# Patient Record
Sex: FEMALE | Race: WHITE | NOT HISPANIC OR LATINO | Employment: UNEMPLOYED | URBAN - METROPOLITAN AREA
[De-identification: names, ages, dates, MRNs, and addresses within clinical notes are randomized per-mention and may not be internally consistent; named-entity substitution may affect disease eponyms.]

---

## 2017-09-13 ENCOUNTER — CONSULT (OUTPATIENT)
Dept: CARDIOLOGY | Facility: CLINIC | Age: 57
End: 2017-09-13

## 2017-09-13 VITALS
DIASTOLIC BLOOD PRESSURE: 108 MMHG | HEART RATE: 65 BPM | WEIGHT: 222.8 LBS | BODY MASS INDEX: 39.48 KG/M2 | SYSTOLIC BLOOD PRESSURE: 162 MMHG | HEIGHT: 63 IN

## 2017-09-13 DIAGNOSIS — R00.2 PALPITATIONS: Primary | ICD-10-CM

## 2017-09-13 DIAGNOSIS — I10 ESSENTIAL HYPERTENSION: ICD-10-CM

## 2017-09-13 DIAGNOSIS — I48.0 PAF (PAROXYSMAL ATRIAL FIBRILLATION) (HCC): ICD-10-CM

## 2017-09-13 PROCEDURE — 99204 OFFICE O/P NEW MOD 45 MIN: CPT | Performed by: INTERNAL MEDICINE

## 2017-09-13 PROCEDURE — 93000 ELECTROCARDIOGRAM COMPLETE: CPT | Performed by: INTERNAL MEDICINE

## 2017-09-13 RX ORDER — CLONAZEPAM 1 MG/1
1 TABLET ORAL 2 TIMES DAILY
COMMUNITY

## 2017-09-13 RX ORDER — NEBIVOLOL 10 MG/1
10 TABLET ORAL DAILY
COMMUNITY
End: 2019-10-07

## 2017-09-13 RX ORDER — MELATONIN
2000 DAILY
COMMUNITY

## 2017-09-13 RX ORDER — FLECAINIDE ACETATE 100 MG/1
100 TABLET ORAL 2 TIMES DAILY
COMMUNITY

## 2017-09-13 RX ORDER — FLUOXETINE HYDROCHLORIDE 20 MG/1
20 CAPSULE ORAL 2 TIMES DAILY
COMMUNITY
End: 2020-07-24

## 2017-09-13 RX ORDER — FLUTICASONE PROPIONATE 50 MCG
2 SPRAY, SUSPENSION (ML) NASAL DAILY
COMMUNITY

## 2017-09-13 RX ORDER — LOSARTAN POTASSIUM 50 MG/1
50 TABLET ORAL DAILY
COMMUNITY
End: 2019-09-26

## 2017-09-13 NOTE — PROGRESS NOTES
Electrophysiology Consult     Annabel Sorensen  1960  [phone]      09/13/17    DATE OF ADMISSION: (Not on file)  [facility] CARDIOLOGY    Esteban Garcia MD  [address] [place] [address] / [facility] [id number]    Chief Complaint   Patient presents with   • Atrial Fibrillation     Problem List:  1. Paroxysmal Atrial Fibrillation/PAT    A. CHADSVasc = 2   B. History of Flecainide Rx   C. PVA 1/16/07   D. Remote normal stress test - data deficit  2. HTN  3. Obesity  4. Anxiety  5. Optic neuritis  6. Chronic Headaches  7. Surgical History   A. Left nasal resection   B. Benign left cyst removal   C. Left brain meningioma with resection   D. BURT/BSO   E. axilllary cyst removal    F. cholecystectomy      History of Present Illness:   57 year old WF, previous patient of Dr. Washington's last seen in 2011, with history of PAF and PACs who presents today for re-evaluation. She had an ablation in 2007 and since then, has been on Flecainide since then to treat PACs. Over the last two years, she has had some increase in her palpitations. They occur about 3-4 times per month, described as a fluttering that does not last very long. She also states that her BPs have been up and down. Her BP gets as high as 190s. Dr. Garcia recently decreased her Bystolic due to low BP, and now she has occasional high readings associated with HA. She denies SOB, syncope, and CP. She states her thyroid levels have been good. She drinks 1 coffee per day. She denies tobacco. She drinks occasional ETOH. No stimulants.     Allergies   Allergen Reactions   • Ibuprofen    • Sulfa Antibiotics         Cannot display prior to admission medications because the patient has not been admitted in this contact.            Current Outpatient Prescriptions:   •  cholecalciferol (VITAMIN D3) 1000 units tablet, Take 1,000 Units by mouth Daily., Disp: , Rfl:   •  clonazePAM (KlonoPIN) 1 MG tablet, Take 1 mg by mouth 2 (Two) Times a Day., Disp: , Rfl:    •  flecainide (TAMBOCOR) 100 MG tablet, Take 100 mg by mouth 2 (Two) Times a Day., Disp: , Rfl:   •  FLUoxetine (PROzac) 20 MG capsule, Take 20 mg by mouth 2 (Two) Times a Day., Disp: , Rfl:   •  fluticasone (FLONASE) 50 MCG/ACT nasal spray, 2 sprays into each nostril Daily., Disp: , Rfl:   •  losartan (COZAAR) 50 MG tablet, Take 50 mg by mouth Daily., Disp: , Rfl:   •  nebivolol (BYSTOLIC) 10 MG tablet, Take 10 mg by mouth Daily., Disp: , Rfl:     Social History     Social History   • Marital status: Unknown     Spouse name: N/A   • Number of children: N/A   • Years of education: N/A     Social History Main Topics   • Smoking status: Never Smoker   • Smokeless tobacco: Never Used   • Alcohol use 0.6 - 1.2 oz/week     1 - 2 Standard drinks or equivalent per week      Comment: monthly   • Drug use: No   • Sexual activity: Defer     Other Topics Concern   • None     Social History Narrative       Family History   Problem Relation Age of Onset   • Stroke Mother      afib   • Cancer Father      lung       REVIEW OF SYSTEMS:   CONST:  No weight loss, fever, chills, weakness or fatigue.   HEENT:  No visual loss, blurred vision, double vision, yellow sclerae.                   No hearing loss, congestion, sore throat.   SKIN:      No rashes, urticaria, ulcers, sores.     RESP:     No shortness of breath, hemoptysis, cough, sputum.   GI:           No anorexia, nausea, vomiting, diarrhea. No abdominal pain, melena.   :         No burning on urination, hematuria or increased frequency.  ENDO:    No diaphoresis, cold or heat intolerance. No polyuria or polydipsia.   NEURO:  + headache, - dizziness, syncope, paralysis, ataxia, or parasthesias.                  No change in bowel or bladder control. No history of CVA/TIA  MUSC:    No muscle, back pain, joint pain or stiffness.   HEME:    No anemia, bleeding, bruising. No history of DVT/PE.  PSYCH:  No history of depression, anxiety    Vitals:    09/13/17 1257   BP: (!)  "162/108   BP Location: Right arm   Patient Position: Sitting   Pulse: 65   Weight: 222 lb 12.8 oz (101 kg)   Height: 63\" (160 cm)                 Physical Exam:  GEN: Well nourished, Well- developed  No acute distress  HEENT: Normocephalic, Atraumatic, PERRLA, moist mucous membranes  NECK: supple, NO JVD, no thyromegaly, no lymphadenopathy  CARD: S1S2  RRR no murmur, gallop, rub  LUNGS: Clear to auscultataion, normal respiratory effort  ABDOMEN: Soft, nontender, normal bowel sounds  EXTREMITIES:No gross deformities,  No clubbing, cyanosis, or edema  SKIN: Warm, dry  NEURO: No focal deficits  PSYCHIATRIC: Normal affect and mood        ECG 12 Lead  Date/Time: 9/13/2017 1:39 PM  Performed by: GERARD WASHINGTON  Authorized by: GERARD WASHINGTON   Rhythm: sinus rhythm  BPM: 65                ICD-10-CM ICD-9-CM   1. Palpitations R00.2 785.1   2. PAF (paroxysmal atrial fibrillation) I48.0 427.31   3. Essential hypertension I10 401.9       Assessment and Plan:   1. Palpitations - she has history of PAF s/p PVA in 2007. Her symptoms sound consistent with probable PACs. We will order an echocardiogram to assess LV function. She seems to be under a lot of stress and this could be triggering her events. If her symptoms get worse, will do event monitor.   2. HTN - with labile readings. She should take her Bystolic in the evening and Losartan at night. Continue to monitor at home.       Scribed for Gerard Washington MD by Liv Forte PA-C. 9/13/2017  1:40 PM      IGerard MD, personally performed the services described in this documentation as scribed by the above named individual in my presence, and it is both accurate and complete.  9/13/2017  1:43 PM  "

## 2017-09-26 ENCOUNTER — HOSPITAL ENCOUNTER (OUTPATIENT)
Dept: CARDIOLOGY | Facility: HOSPITAL | Age: 57
Discharge: HOME | End: 2017-09-26
Attending: INTERNAL MEDICINE | Admitting: INTERNAL MEDICINE

## 2017-09-26 VITALS — BODY MASS INDEX: 39.34 KG/M2 | HEIGHT: 63 IN | WEIGHT: 222 LBS

## 2017-09-26 DIAGNOSIS — I10 ESSENTIAL HYPERTENSION: ICD-10-CM

## 2017-09-26 DIAGNOSIS — I48.0 PAF (PAROXYSMAL ATRIAL FIBRILLATION) (HCC): ICD-10-CM

## 2017-09-26 LAB
BH CV ECHO MEAS - AO MAX PG (FULL): 1.7 MMHG
BH CV ECHO MEAS - AO MAX PG: 4.3 MMHG
BH CV ECHO MEAS - AO MEAN PG (FULL): 1.3 MMHG
BH CV ECHO MEAS - AO MEAN PG: 2.8 MMHG
BH CV ECHO MEAS - AO ROOT AREA (BSA CORRECTED): 1.5
BH CV ECHO MEAS - AO ROOT AREA: 7.7 CM^2
BH CV ECHO MEAS - AO ROOT DIAM: 3.1 CM
BH CV ECHO MEAS - AO V2 MAX: 103.7 CM/SEC
BH CV ECHO MEAS - AO V2 MEAN: 78.6 CM/SEC
BH CV ECHO MEAS - AO V2 VTI: 21 CM
BH CV ECHO MEAS - AVA(I,A): 2.8 CM^2
BH CV ECHO MEAS - AVA(I,D): 2.8 CM^2
BH CV ECHO MEAS - AVA(V,A): 2.9 CM^2
BH CV ECHO MEAS - AVA(V,D): 2.9 CM^2
BH CV ECHO MEAS - BSA(HAYCOCK): 2.2 M^2
BH CV ECHO MEAS - BSA: 2 M^2
BH CV ECHO MEAS - BZI_BMI: 39.3 KILOGRAMS/M^2
BH CV ECHO MEAS - BZI_METRIC_HEIGHT: 160 CM
BH CV ECHO MEAS - BZI_METRIC_WEIGHT: 100.7 KG
BH CV ECHO MEAS - CONTRAST EF (2CH): 69.7 ML/M^2
BH CV ECHO MEAS - CONTRAST EF 4CH: 69.1 ML/M^2
BH CV ECHO MEAS - EDV(CUBED): 122.9 ML
BH CV ECHO MEAS - EDV(MOD-SP2): 66 ML
BH CV ECHO MEAS - EDV(MOD-SP4): 68 ML
BH CV ECHO MEAS - EDV(TEICH): 116.7 ML
BH CV ECHO MEAS - EF(CUBED): 71.9 %
BH CV ECHO MEAS - EF(MOD-SP2): 69.7 %
BH CV ECHO MEAS - EF(MOD-SP4): 69.1 %
BH CV ECHO MEAS - EF(TEICH): 63.4 %
BH CV ECHO MEAS - ESV(CUBED): 34.5 ML
BH CV ECHO MEAS - ESV(MOD-SP2): 20 ML
BH CV ECHO MEAS - ESV(MOD-SP4): 21 ML
BH CV ECHO MEAS - ESV(TEICH): 42.7 ML
BH CV ECHO MEAS - FS: 34.5 %
BH CV ECHO MEAS - IVS/LVPW: 0.83
BH CV ECHO MEAS - IVSD: 0.87 CM
BH CV ECHO MEAS - LA DIMENSION: 3.6 CM
BH CV ECHO MEAS - LA/AO: 1.1
BH CV ECHO MEAS - LAT PEAK E' VEL: 6.6 CM/SEC
BH CV ECHO MEAS - LV DIASTOLIC VOL/BSA (35-75): 33.6 ML/M^2
BH CV ECHO MEAS - LV MASS(C)D: 169.6 GRAMS
BH CV ECHO MEAS - LV MASS(C)DI: 83.9 GRAMS/M^2
BH CV ECHO MEAS - LV MAX PG: 2.6 MMHG
BH CV ECHO MEAS - LV MEAN PG: 1.4 MMHG
BH CV ECHO MEAS - LV SYSTOLIC VOL/BSA (12-30): 10.4 ML/M^2
BH CV ECHO MEAS - LV V1 MAX: 80.2 CM/SEC
BH CV ECHO MEAS - LV V1 MEAN: 56.2 CM/SEC
BH CV ECHO MEAS - LV V1 VTI: 15.4 CM
BH CV ECHO MEAS - LVIDD: 5 CM
BH CV ECHO MEAS - LVIDS: 3.3 CM
BH CV ECHO MEAS - LVLD AP2: 6.9 CM
BH CV ECHO MEAS - LVLD AP4: 7.6 CM
BH CV ECHO MEAS - LVLS AP2: 6 CM
BH CV ECHO MEAS - LVLS AP4: 6.5 CM
BH CV ECHO MEAS - LVOT AREA (M): 3.8 CM^2
BH CV ECHO MEAS - LVOT AREA: 3.8 CM^2
BH CV ECHO MEAS - LVOT DIAM: 2.2 CM
BH CV ECHO MEAS - LVPWD: 1 CM
BH CV ECHO MEAS - MED PEAK E' VEL: 5.13 CM/SEC
BH CV ECHO MEAS - MV A MAX VEL: 51.8 CM/SEC
BH CV ECHO MEAS - MV DEC TIME: 0.3 SEC
BH CV ECHO MEAS - MV E MAX VEL: 50.1 CM/SEC
BH CV ECHO MEAS - MV E/A: 0.97
BH CV ECHO MEAS - PA ACC SLOPE: 573.1 CM/SEC^2
BH CV ECHO MEAS - PA ACC TIME: 0.11 SEC
BH CV ECHO MEAS - PA PR(ACCEL): 30.3 MMHG
BH CV ECHO MEAS - RVDD: 3 CM
BH CV ECHO MEAS - SI(AO): 79.6 ML/M^2
BH CV ECHO MEAS - SI(CUBED): 43.7 ML/M^2
BH CV ECHO MEAS - SI(LVOT): 28.9 ML/M^2
BH CV ECHO MEAS - SI(MOD-SP2): 22.8 ML/M^2
BH CV ECHO MEAS - SI(MOD-SP4): 23.2 ML/M^2
BH CV ECHO MEAS - SI(TEICH): 36.6 ML/M^2
BH CV ECHO MEAS - SV(AO): 160.9 ML
BH CV ECHO MEAS - SV(CUBED): 88.4 ML
BH CV ECHO MEAS - SV(LVOT): 58.4 ML
BH CV ECHO MEAS - SV(MOD-SP2): 46 ML
BH CV ECHO MEAS - SV(MOD-SP4): 47 ML
BH CV ECHO MEAS - SV(TEICH): 74 ML
BH CV ECHO MEAS - TAPSE (>1.6): 2.2 CM2
BH CV VAS BP LEFT ARM: NORMAL MMHG
BH CV XLRA - RV BASE: 4.2 CM
BH CV XLRA - RV LENGTH: 6.9 CM
BH CV XLRA - RV MID: 3.6 CM
BH CV XLRA - TDI S': 12.5 CM/SEC
E/E' RATIO: 8.7
LEFT ATRIUM VOLUME INDEX: 13.4 ML/M2

## 2017-09-26 PROCEDURE — 93306 TTE W/DOPPLER COMPLETE: CPT

## 2017-09-26 PROCEDURE — 93306 TTE W/DOPPLER COMPLETE: CPT | Performed by: INTERNAL MEDICINE

## 2017-09-27 ENCOUNTER — DOCUMENTATION (OUTPATIENT)
Dept: CARDIOLOGY | Facility: CLINIC | Age: 57
End: 2017-09-27

## 2017-09-27 NOTE — PROGRESS NOTES
I tried to call the patient, per Dr. Washington's request,to let her know that her ECHO was normal. No answer. I left a message.

## 2017-09-28 ENCOUNTER — TELEPHONE (OUTPATIENT)
Dept: CARDIOLOGY | Facility: CLINIC | Age: 57
End: 2017-09-28

## 2017-09-29 ENCOUNTER — DOCUMENTATION (OUTPATIENT)
Dept: CARDIOLOGY | Facility: CLINIC | Age: 57
End: 2017-09-29

## 2019-08-19 ENCOUNTER — TELEPHONE (OUTPATIENT)
Dept: CARDIOLOGY | Facility: CLINIC | Age: 59
End: 2019-08-19

## 2019-08-19 NOTE — TELEPHONE ENCOUNTER
Patient called and left a message. I called her back and she hung up on me. I think she may have forgotten why she called us.

## 2019-09-26 ENCOUNTER — CONSULT (OUTPATIENT)
Dept: CARDIOLOGY | Facility: CLINIC | Age: 59
End: 2019-09-26

## 2019-09-26 VITALS
BODY MASS INDEX: 42.56 KG/M2 | HEART RATE: 66 BPM | SYSTOLIC BLOOD PRESSURE: 183 MMHG | WEIGHT: 225.4 LBS | HEIGHT: 61 IN | DIASTOLIC BLOOD PRESSURE: 111 MMHG

## 2019-09-26 DIAGNOSIS — I10 ESSENTIAL HYPERTENSION: Primary | ICD-10-CM

## 2019-09-26 PROCEDURE — 93000 ELECTROCARDIOGRAM COMPLETE: CPT | Performed by: PHYSICIAN ASSISTANT

## 2019-09-26 PROCEDURE — 99243 OFF/OP CNSLTJ NEW/EST LOW 30: CPT | Performed by: PHYSICIAN ASSISTANT

## 2019-09-26 RX ORDER — DULOXETIN HYDROCHLORIDE 60 MG/1
60 CAPSULE, DELAYED RELEASE ORAL DAILY
COMMUNITY

## 2019-09-26 RX ORDER — DULOXETIN HYDROCHLORIDE 30 MG/1
30 CAPSULE, DELAYED RELEASE ORAL DAILY
COMMUNITY
End: 2020-07-24

## 2019-09-26 RX ORDER — HYDROCHLOROTHIAZIDE 25 MG/1
25 TABLET ORAL DAILY
Qty: 30 TABLET | Refills: 11 | Status: SHIPPED | OUTPATIENT
Start: 2019-09-26 | End: 2021-08-06

## 2019-09-26 RX ORDER — NAPROXEN SODIUM 220 MG
220 TABLET ORAL AS NEEDED
COMMUNITY

## 2019-09-26 RX ORDER — LISINOPRIL 20 MG/1
20 TABLET ORAL DAILY
COMMUNITY
End: 2020-07-24

## 2019-09-26 RX ORDER — ASPIRIN 81 MG/1
81 TABLET ORAL DAILY
COMMUNITY

## 2019-09-26 NOTE — PATIENT INSTRUCTIONS
"DASH Eating Plan  DASH stands for \"Dietary Approaches to Stop Hypertension.\" The DASH eating plan is a healthy eating plan that has been shown to reduce high blood pressure (hypertension). It may also reduce your risk for type 2 diabetes, heart disease, and stroke. The DASH eating plan may also help with weight loss.  What are tips for following this plan?    General guidelines  · Avoid eating more than 2,300 mg (milligrams) of salt (sodium) a day. If you have hypertension, you may need to reduce your sodium intake to 1,500 mg a day.  · Limit alcohol intake to no more than 1 drink a day for nonpregnant women and 2 drinks a day for men. One drink equals 12 oz of beer, 5 oz of wine, or 1½ oz of hard liquor.  · Work with your health care provider to maintain a healthy body weight or to lose weight. Ask what an ideal weight is for you.  · Get at least 30 minutes of exercise that causes your heart to beat faster (aerobic exercise) most days of the week. Activities may include walking, swimming, or biking.  · Work with your health care provider or diet and nutrition specialist (dietitian) to adjust your eating plan to your individual calorie needs.  Reading food labels    · Check food labels for the amount of sodium per serving. Choose foods with less than 5 percent of the Daily Value of sodium. Generally, foods with less than 300 mg of sodium per serving fit into this eating plan.  · To find whole grains, look for the word \"whole\" as the first word in the ingredient list.  Shopping  · Buy products labeled as \"low-sodium\" or \"no salt added.\"  · Buy fresh foods. Avoid canned foods and premade or frozen meals.  Cooking  · Avoid adding salt when cooking. Use salt-free seasonings or herbs instead of table salt or sea salt. Check with your health care provider or pharmacist before using salt substitutes.  · Do not gutierrez foods. Cook foods using healthy methods such as baking, boiling, grilling, and broiling instead.  · Cook with " heart-healthy oils, such as olive, canola, soybean, or sunflower oil.  Meal planning  · Eat a balanced diet that includes:  ? 5 or more servings of fruits and vegetables each day. At each meal, try to fill half of your plate with fruits and vegetables.  ? Up to 6-8 servings of whole grains each day.  ? Less than 6 oz of lean meat, poultry, or fish each day. A 3-oz serving of meat is about the same size as a deck of cards. One egg equals 1 oz.  ? 2 servings of low-fat dairy each day.  ? A serving of nuts, seeds, or beans 5 times each week.  ? Heart-healthy fats. Healthy fats called Omega-3 fatty acids are found in foods such as flaxseeds and coldwater fish, like sardines, salmon, and mackerel.  · Limit how much you eat of the following:  ? Canned or prepackaged foods.  ? Food that is high in trans fat, such as fried foods.  ? Food that is high in saturated fat, such as fatty meat.  ? Sweets, desserts, sugary drinks, and other foods with added sugar.  ? Full-fat dairy products.  · Do not salt foods before eating.  · Try to eat at least 2 vegetarian meals each week.  · Eat more home-cooked food and less restaurant, buffet, and fast food.  · When eating at a restaurant, ask that your food be prepared with less salt or no salt, if possible.  What foods are recommended?  The items listed may not be a complete list. Talk with your dietitian about what dietary choices are best for you.  Grains  Whole-grain or whole-wheat bread. Whole-grain or whole-wheat pasta. Brown rice. Oatmeal. Quinoa. Bulgur. Whole-grain and low-sodium cereals. Jacinda bread. Low-fat, low-sodium crackers. Whole-wheat flour tortillas.  Vegetables  Fresh or frozen vegetables (raw, steamed, roasted, or grilled). Low-sodium or reduced-sodium tomato and vegetable juice. Low-sodium or reduced-sodium tomato sauce and tomato paste. Low-sodium or reduced-sodium canned vegetables.  Fruits  All fresh, dried, or frozen fruit. Canned fruit in natural juice (without  added sugar).  Meat and other protein foods  Skinless chicken or turkey. Ground chicken or turkey. Pork with fat trimmed off. Fish and seafood. Egg whites. Dried beans, peas, or lentils. Unsalted nuts, nut butters, and seeds. Unsalted canned beans. Lean cuts of beef with fat trimmed off. Low-sodium, lean deli meat.  Dairy  Low-fat (1%) or fat-free (skim) milk. Fat-free, low-fat, or reduced-fat cheeses. Nonfat, low-sodium ricotta or cottage cheese. Low-fat or nonfat yogurt. Low-fat, low-sodium cheese.  Fats and oils  Soft margarine without trans fats. Vegetable oil. Low-fat, reduced-fat, or light mayonnaise and salad dressings (reduced-sodium). Canola, safflower, olive, soybean, and sunflower oils. Avocado.  Seasoning and other foods  Herbs. Spices. Seasoning mixes without salt. Unsalted popcorn and pretzels. Fat-free sweets.  What foods are not recommended?  The items listed may not be a complete list. Talk with your dietitian about what dietary choices are best for you.  Grains  Baked goods made with fat, such as croissants, muffins, or some breads. Dry pasta or rice meal packs.  Vegetables  Creamed or fried vegetables. Vegetables in a cheese sauce. Regular canned vegetables (not low-sodium or reduced-sodium). Regular canned tomato sauce and paste (not low-sodium or reduced-sodium). Regular tomato and vegetable juice (not low-sodium or reduced-sodium). Pickles. Olives.  Fruits  Canned fruit in a light or heavy syrup. Fried fruit. Fruit in cream or butter sauce.  Meat and other protein foods  Fatty cuts of meat. Ribs. Fried meat. Carmona. Sausage. Bologna and other processed lunch meats. Salami. Fatback. Hotdogs. Bratwurst. Salted nuts and seeds. Canned beans with added salt. Canned or smoked fish. Whole eggs or egg yolks. Chicken or turkey with skin.  Dairy  Whole or 2% milk, cream, and half-and-half. Whole or full-fat cream cheese. Whole-fat or sweetened yogurt. Full-fat cheese. Nondairy creamers. Whipped toppings.  Processed cheese and cheese spreads.  Fats and oils  Butter. Stick margarine. Lard. Shortening. Ghee. Carmona fat. Tropical oils, such as coconut, palm kernel, or palm oil.  Seasoning and other foods  Salted popcorn and pretzels. Onion salt, garlic salt, seasoned salt, table salt, and sea salt. Worcestershire sauce. Tartar sauce. Barbecue sauce. Teriyaki sauce. Soy sauce, including reduced-sodium. Steak sauce. Canned and packaged gravies. Fish sauce. Oyster sauce. Cocktail sauce. Horseradish that you find on the shelf. Ketchup. Mustard. Meat flavorings and tenderizers. Bouillon cubes. Hot sauce and Tabasco sauce. Premade or packaged marinades. Premade or packaged taco seasonings. Relishes. Regular salad dressings.  Where to find more information:  · National Heart, Lung, and Blood Vian: www.nhlbi.nih.gov  · American Heart Association: www.heart.org  Summary  · The DASH eating plan is a healthy eating plan that has been shown to reduce high blood pressure (hypertension). It may also reduce your risk for type 2 diabetes, heart disease, and stroke.  · With the DASH eating plan, you should limit salt (sodium) intake to 2,300 mg a day. If you have hypertension, you may need to reduce your sodium intake to 1,500 mg a day.  · When on the DASH eating plan, aim to eat more fresh fruits and vegetables, whole grains, lean proteins, low-fat dairy, and heart-healthy fats.  · Work with your health care provider or diet and nutrition specialist (dietitian) to adjust your eating plan to your individual calorie needs.  This information is not intended to replace advice given to you by your health care provider. Make sure you discuss any questions you have with your health care provider.  Document Released: 12/06/2012 Document Revised: 12/11/2017 Document Reviewed: 12/11/2017  [employer] Interactive Patient Education © 2019 [employer]

## 2019-09-26 NOTE — PROGRESS NOTES
[facility]  INITIAL OFFICE CONSULT      Annabel Sorensen  1960  PCP: Esteban Garcia MD    SUBJECTIVE:   Annabel Sorensen is a 59 y.o. female seen for a consultation visit regarding the following:     Chief Complaint:   Chief Complaint   Patient presents with   • Medication Changes          Consultation is requested by No ref. provider found for evaluation of Medication Changes        History:  59-year-old female returns today follow-up regarding atrial fibrillation atrial tachycardia hypertension and palpitations.  She also has history of significant diastolic dysfunction by remote echocardiogram.  Mrs. Salvador states that Tambocor therapy has improved upon her palpitations she has not had any breakthrough episodes atrial fibrillation.  She denies any episodes of significant sustained rapid heartbeats dizziness near syncope or syncope.  She denies any chest pain suggesting angina pectoris.  She denies any heart failure symptoms.  She reports her blood pressure has been issues been trying to take medications and also changing lifestyle such as reducing sodium diet and exercise but continues to have difficulty with this.  She is under a lot of stress she is taking care of a sister and she relates this is causing some of her blood pressure issues.  She wishes to establish care again with her office and for request to continue medical management of her hypertension.      Cardiac PMH: (Old records have been reviewed and summarized below)  1. Paroxysmal Atrial Fibrillation/PAT                A. CHADSVasc = 2              B. History of Flecainide Rx              C. PVA 1/16/07              D. Remote normal stress test - data deficit   E. Echo diastolic dysfunction.   2. HTN      Past Medical History, Past Surgical History, Family history, Social History, and Medications were all reviewed with the patient today and updated as necessary.     Current Outpatient Medications    Medication Sig Dispense Refill   • aspirin 81 MG EC tablet Take 81 mg by mouth Daily.     • cholecalciferol (VITAMIN D3) 1000 units tablet Take 1,000 Units by mouth 2 (Two) Times a Day.     • clonazePAM (KlonoPIN) 1 MG tablet Take 1 mg by mouth 2 (Two) Times a Day.     • DULoxetine (CYMBALTA) 30 MG capsule Take 30 mg by mouth Daily.     • DULoxetine (CYMBALTA) 60 MG capsule Take 60 mg by mouth Daily.     • flecainide (TAMBOCOR) 100 MG tablet Take 100 mg by mouth 2 (Two) Times a Day.     • FLUoxetine (PROzac) 20 MG capsule Take 20 mg by mouth 2 (Two) Times a Day.     • fluticasone (FLONASE) 50 MCG/ACT nasal spray 2 sprays into each nostril Daily.     • lisinopril (PRINIVIL,ZESTRIL) 20 MG tablet Take 20 mg by mouth Daily.     • naproxen sodium (ALEVE) 220 MG tablet Take 220 mg by mouth As Needed.     • nebivolol (BYSTOLIC) 10 MG tablet Take 10 mg by mouth Daily.     • hydroCHLOROthiazide (HYDRODIURIL) 25 MG tablet Take 1 tablet by mouth Daily. 30 tablet 11     No current facility-administered medications for this visit.      Allergies   Allergen Reactions   • Ibuprofen    • Sulfa Antibiotics          Past Medical History:   Diagnosis Date   • Anxiety    • Arrhythmia    • Chest pain    • Chronic headaches     of unknown etiology   • Hypertension    • Obesity    • Optic neuritis    • PAF (paroxysmal atrial fibrillation) (CMS/HCC)      Past Surgical History:   Procedure Laterality Date   • BRAIN SURGERY      Incidental left brain meningioma with removal 1998   • BREAST SURGERY Left     benign left breast cyst removal   • HYSTERECTOMY      BURT/BSO   • OTHER SURGICAL HISTORY      Left nasal cyst resection 1969; recurrent left nasal cyst resection 1972   • OTHER SURGICAL HISTORY      Remote tubal pregnancy/resection 1994   • OTHER SURGICAL HISTORY      Benign right axillary cyst removal ? 2002   • OTHER SURGICAL HISTORY      Pulmonary vein ablation 2007     Family History   Problem Relation Age of Onset   • Stroke Mother   "       afib   • Cancer Father         lung     Social History     Tobacco Use   • Smoking status: Never Smoker   • Smokeless tobacco: Never Used   Substance Use Topics   • Alcohol use: Yes     Alcohol/week: 0.6 - 1.2 oz     Types: 1 - 2 Standard drinks or equivalent per week     Comment: monthly       ROS:  Review of Symptoms:  General: + fatigue  Skin: no rashes, lumps, or other skin changes  HEENT: no dizziness, lightheadedness, or vision changes  Respiratory: no cough or hemoptysis  Cardiovascular: + rare palpitations, and tachycardia  Gastrointestinal: no black/tarry stools or diarrhea  Urinary: no change in frequency or urgency  Peripheral Vascular: no claudication or leg cramps  Musculoskeletal: + fibromylagia  Psychiatric: no depression or excessive stress  Neurological: no sensory or motor loss, no syncope  Hematologic: no anemia, easy bruising or bleeding  Endocrine: no thyroid problems, nor heat or cold intolerance         PHYSICAL EXAM:   BP (!) 183/111 (BP Location: Left arm, Patient Position: Sitting)   Pulse 66   Ht 154.9 cm (61\")   Wt 102 kg (225 lb 6.4 oz)   BMI 42.59 kg/m²      Wt Readings from Last 5 Encounters:   09/26/19 102 kg (225 lb 6.4 oz)   09/26/17 101 kg (222 lb)   09/13/17 101 kg (222 lb 12.8 oz)     BP Readings from Last 5 Encounters:   09/26/19 (!) 183/111   09/13/17 (!) 162/108       General-Well Nourished, Well developed  Eyes - PERRLA  Neck- supple, No mass  CV- regular rate and rhythm, no MRG  Lung- clear bilaterally  Abd- soft, +BS  Musc/skel - Norm strength and range of motion  Skin- warm and dry  Neuro - Alert & Oriented x 3, appropriate mood.    Medical problems and test results were reviewed with the patient today.         No results found for: CHOL, HDL, HDLC, LDL, LDLC, VLDL    EKG:  (EKG/Tracing has been independently visualized by me and summarized below)      ECG 12 Lead  Date/Time: 9/26/2019 9:56 AM  Performed by: Venu Samayoa PA  Authorized by: Yao, " JOAN Muniz   Comparison: compared with previous ECG from 9/13/2017  Similar to previous ECG  Rhythm: sinus rhythm  Rate: normal  Conduction: conduction normal  ST Segments: ST segments normal  T Waves: T waves normal  QRS axis: normal  Other: no other findings    Clinical impression: normal ECG              Echocardiogram 2017    · Left ventricular systolic function is normal.  · Left ventricular diastolic dysfunction (grade I) consistent with impaired relaxation.          ASSESSMENT   1. PAF:  Remote PVA 2007. No recurrent Afib.   2. Palpitations: PAC's by remote monitor controlled on Tambocor since 2007. EKG today QRS 100ms. Previous EKG QRS 100ms.  Continue to monitor closely.   3. HTN: Uncontrolled, on bb and ACE.  Consider adding Hctz,   4. Anticoagulation:  Chadsvasc=2.  5. Echocardiogram 2017, Normal EF diastolic dysfunction.        PLAN  · We will add hydrochlorthiazide 25 mg daily to current regimen as her blood pressure is uncontrolled.  She will continue diet exercise weight loss reduce sodium  · Follow-up BMP in 1 week  · Patient requires Bystolic therapy cannot switch to another beta-blockers because of the requirement of needing for blood pressure control.  · Return follow-up 6 months or sooner as needed.            Cardiology/Electrophysiology  09/26/19  12:33 PM  Will Yao ONEILL

## 2019-09-27 ENCOUNTER — TELEPHONE (OUTPATIENT)
Dept: CARDIOLOGY | Facility: CLINIC | Age: 59
End: 2019-09-27

## 2019-09-27 NOTE — TELEPHONE ENCOUNTER
Attempting to get bystolic approval. [employer] will not approve medication unless 9 other BB have been attempted. We have no documentation of any other medications attempted. I called the patient and explained that unless her PCP office has documentation of her being on these 9 other meds she will have to try them first. Per Will, start Coreg 12.5 BID if we cannot get Bystolic approved. Patient is contacting PCP office to find out if what she has tried in the past.

## 2019-10-02 ENCOUNTER — TELEPHONE (OUTPATIENT)
Dept: CARDIOLOGY | Facility: CLINIC | Age: 59
End: 2019-10-02

## 2019-10-02 NOTE — TELEPHONE ENCOUNTER
Patient called to let you know that you started her on HCTZ 25 mg daily. She is allergic to sulfa drugs and cannot tolerate this medication. I instructed her to stop it and I would call her back with further instructions.

## 2019-10-07 ENCOUNTER — TELEPHONE (OUTPATIENT)
Dept: CARDIOLOGY | Facility: CLINIC | Age: 59
End: 2019-10-07

## 2019-10-07 DIAGNOSIS — I48.0 PAROXYSMAL ATRIAL FIBRILLATION (HCC): Primary | ICD-10-CM

## 2019-10-07 RX ORDER — CARVEDILOL 12.5 MG/1
12.5 TABLET ORAL 2 TIMES DAILY
Qty: 60 TABLET | Refills: 11 | Status: SHIPPED | OUTPATIENT
Start: 2019-10-07 | End: 2020-12-07

## 2019-10-07 NOTE — TELEPHONE ENCOUNTER
----- Message from JOAN Nicole sent at 10/7/2019  7:27 AM EDT -----  Discontinue HCTZ,  Increase Zestril 20mg BID, BMP one week

## 2019-10-10 ENCOUNTER — TELEPHONE (OUTPATIENT)
Dept: CARDIOLOGY | Facility: CLINIC | Age: 59
End: 2019-10-10

## 2019-11-14 ENCOUNTER — TELEPHONE (OUTPATIENT)
Dept: CARDIOLOGY | Facility: CLINIC | Age: 59
End: 2019-11-14

## 2019-11-20 NOTE — TELEPHONE ENCOUNTER
PT returned call and she is aware of her BMP results. She said her PCP has increased her lisinopril to 40mg 1 BID and put her on Lasix 20mg 1/2 prn for edema or weight gain greater than 4 lbs in 24hrs. Please advise if you want us to do anything.

## 2020-07-24 ENCOUNTER — OFFICE VISIT (OUTPATIENT)
Dept: CARDIOLOGY | Facility: CLINIC | Age: 60
End: 2020-07-24

## 2020-07-24 VITALS
HEART RATE: 68 BPM | BODY MASS INDEX: 39.27 KG/M2 | DIASTOLIC BLOOD PRESSURE: 88 MMHG | HEIGHT: 64 IN | SYSTOLIC BLOOD PRESSURE: 138 MMHG | WEIGHT: 230 LBS

## 2020-07-24 DIAGNOSIS — R00.2 PALPITATIONS: ICD-10-CM

## 2020-07-24 DIAGNOSIS — I10 ESSENTIAL HYPERTENSION: ICD-10-CM

## 2020-07-24 DIAGNOSIS — E66.09 CLASS 2 OBESITY DUE TO EXCESS CALORIES WITHOUT SERIOUS COMORBIDITY WITH BODY MASS INDEX (BMI) OF 39.0 TO 39.9 IN ADULT: ICD-10-CM

## 2020-07-24 DIAGNOSIS — I48.0 PAF (PAROXYSMAL ATRIAL FIBRILLATION) (HCC): Primary | ICD-10-CM

## 2020-07-24 PROCEDURE — 93000 ELECTROCARDIOGRAM COMPLETE: CPT | Performed by: NURSE PRACTITIONER

## 2020-07-24 PROCEDURE — 99213 OFFICE O/P EST LOW 20 MIN: CPT | Performed by: NURSE PRACTITIONER

## 2020-07-24 RX ORDER — NITROGLYCERIN 0.4 MG/1
0.4 TABLET SUBLINGUAL
COMMUNITY

## 2020-07-24 NOTE — PROGRESS NOTES
Cardiac Electrophysiology Outpatient Follow Up Note            [facility] at [facility]    Follow Up Office Visit      Annabel Sorensen  [id number]  07/24/2020    Primary Care Physician: Esteban Garcia MD    Referred By: No ref. provider found    Subjective     Chief Complaint:   Chief Complaint   Patient presents with   • Congestive Heart Failure   • Hypertension   • Palpitations     Problem list:    1. Paroxysmal Atrial Fibrillation/PAT diagnosed 2007              A. CHADSVasc = 2              B. History of Flecainide Rx              C. PVA 1/16/07              D. Remote normal stress test - data deficit              E. Echo 9/26/2017 normal LV function, impaired relaxation with diastolic dysfunction.   2.  Essential HTN    History of Present Illness:   Mrs. Annabel Sorensen is a 60 y.o. female who presents to my electrophysiology clinic for follow up of of her history of atrial fibrillation and atrial tachycardia status post ablation in 2007 per Dr. Washington.  Patient is currently maintained on flecainide therapy without noted side effects.  Patient denies tachypalpitations but admits to occasional palpitations/flutters in her chest that are not frequent or bothersome.  Patient denies dizziness, presyncope, or syncope.  Patient denies TIA/strokelike symptoms.  Patient's blood pressure is under well controlled today with  mmHg.  Patient reports daily checks her blood pressure at home with -130 mmHg.  .      Review of Systems:   Constitutional: No fevers or chills, no recent weight gain or weight loss or fatigue  Eyes: No visual loss, blurred vision, double vision, yellow sclerae.  ENT: No headaches, hearing loss, vertigo, congestion or sore throat.   Cardiovascular: Per HPI  Respiratory: No cough or wheezing, no sputum production, no hematemesis   Gastrointestinal: No abdominal pain, no nausea, vomiting, constipation, diarrhea, melena.    Genitourinary: No dysuria, hematuria or increased frequency.  Musculoskeletal:  No gait disturbance, weakness or joint pain or stiffness  Integumentary: No rashes, urticaria, ulcers or sores.   Neurological: No headache, dizziness, syncope, paralysis, ataxia, no prior CVA/TIA  Psychiatric: No anxiety, or depression  Endocrine: No diaphoresis, cold or heat intolerance. No polyuria or polydipsia.   Hematologic/Lymphatic: No anemia, abnormal bruising or bleeding. No history of DVT/PE.      Past Medical History:   Past Medical History:   Diagnosis Date   • Anxiety    • Arrhythmia    • Chest pain    • Chronic headaches     of unknown etiology   • Hernia of pelvic floor    • Hypertension    • Obesity    • Optic neuritis    • PAF (paroxysmal atrial fibrillation) (CMS/HCC)        Past Surgical History:   Past Surgical History:   Procedure Laterality Date   • BRAIN SURGERY      Incidental left brain meningioma with removal 1998   • BREAST SURGERY Left     benign left breast cyst removal   • HYSTERECTOMY      BURT/BSO   • OTHER SURGICAL HISTORY      Left nasal cyst resection 1969; recurrent left nasal cyst resection 1972   • OTHER SURGICAL HISTORY      Remote tubal pregnancy/resection 1994   • OTHER SURGICAL HISTORY      Benign right axillary cyst removal ? 2002   • OTHER SURGICAL HISTORY      Pulmonary vein ablation 2007       Family History:   Family History   Problem Relation Age of Onset   • Stroke Mother         afib   • Cancer Father         lung       Social History:   Social History     Socioeconomic History   • Marital status:      Spouse name: Not on file   • Number of children: Not on file   • Years of education: Not on file   • Highest education level: Not on file   Tobacco Use   • Smoking status: Never Smoker   • Smokeless tobacco: Never Used   Substance and Sexual Activity   • Alcohol use: Not Currently     Alcohol/week: 1.0 - 2.0 standard drinks     Types: 1 - 2 Standard drinks or equivalent per week     " Comment: monthly   • Drug use: No   • Sexual activity: Defer       Medications:     Current Outpatient Medications:   •  AMLODIPINE BENZOATE PO, Take  by mouth., Disp: , Rfl:   •  aspirin 81 MG EC tablet, Take 81 mg by mouth Daily., Disp: , Rfl:   •  carvedilol (COREG) 12.5 MG tablet, Take 1 tablet by mouth 2 (Two) Times a Day. Take instead of Bystolic., Disp: 60 tablet, Rfl: 11  •  cholecalciferol (VITAMIN D3) 1000 units tablet, Take 1,000 Units by mouth 2 (Two) Times a Day., Disp: , Rfl:   •  clonazePAM (KlonoPIN) 1 MG tablet, Take 1 mg by mouth 2 (Two) Times a Day., Disp: , Rfl:   •  DULoxetine (CYMBALTA) 60 MG capsule, Take 60 mg by mouth Daily., Disp: , Rfl:   •  flecainide (TAMBOCOR) 100 MG tablet, Take 100 mg by mouth 2 (Two) Times a Day., Disp: , Rfl:   •  fluticasone (FLONASE) 50 MCG/ACT nasal spray, 2 sprays into each nostril Daily., Disp: , Rfl:   •  hydroCHLOROthiazide (HYDRODIURIL) 25 MG tablet, Take 1 tablet by mouth Daily., Disp: 30 tablet, Rfl: 11  •  naproxen sodium (ALEVE) 220 MG tablet, Take 220 mg by mouth As Needed., Disp: , Rfl:   •  nitroglycerin (NITROSTAT) 0.4 MG SL tablet, Place 0.4 mg under the tongue Every 5 (Five) Minutes As Needed for Chest Pain. Take no more than 3 doses in 15 minutes., Disp: , Rfl:     Allergies:   Allergies   Allergen Reactions   • Ibuprofen    • Sulfa Antibiotics        Objective     Physical Exam:  Vital Signs:   Vitals:    07/24/20 1427   BP: 138/88   BP Location: Left arm   Patient Position: Sitting   Pulse: 68   Weight: 104 kg (230 lb)   Height: 162.6 cm (64\")     Constitutional: No fevers or chills, no recent weight gain or weight loss or fatigue  Eyes: No visual loss, blurred vision, double vision, yellow sclerae.  ENT: No headaches, hearing loss, vertigo, congestion or sore throat.   Cardiovascular: Per HPI  Respiratory: No cough or wheezing, no sputum production, no hematemesis   Gastrointestinal: No abdominal pain, no nausea, vomiting, constipation, " diarrhea, melena.   Genitourinary: No dysuria, hematuria or increased frequency.  Musculoskeletal:  No gait disturbance, weakness or joint pain or stiffness  Integumentary: No rashes, urticaria, ulcers or sores.   Neurological: No headache, dizziness, syncope, paralysis, ataxia, no prior CVA/TIA  Psychiatric: No anxiety, or depression  Endocrine: No diaphoresis, cold or heat intolerance. No polyuria or polydipsia.   Hematologic/Lymphatic: No anemia, abnormal bruising or bleeding. No history of DVT/PE.          Cardiac Testing:     I personally viewed and interpreted the patient's EKG/Telemetry/lab data      ECG 12 Lead  Date/Time: 7/24/2020 3:03 PM  Performed by: Evgeny Adair APRN  Authorized by: Evgeny Adair APRN   Comparison: compared with previous ECG from 9/26/2019  Similar to previous ECG  Rhythm: sinus rhythm  BPM: 68  Conduction: incomplete left bundle branch block  Comments:  ms, QT/QTc 394/418 ms                Assessment & Plan          Annabel was seen today for congestive heart failure, hypertension and palpitations.    Diagnoses and all orders for this visit:    PAF (paroxysmal atrial fibrillation) (CMS/HCC)    Essential hypertension    Palpitations    Class 2 obesity due to excess calories without serious comorbidity with body mass index (BMI) of 39.0 to 39.9 in adult    Other orders  -     ECG 12 Lead      Plan: Ms. Sorensen is seen in EP follow-up status post remote PDA in 2007 for a history of paroxysmal atrial fibrillation and atrial tachycardia.  Patient previously followed by Dr. Washington and Will Yao PAC last seen in follow-up on 9/26/2019.  Patient is currently stable from a cardiovascular standpoint.  She denies chest pain, palpitations, dizziness, or presyncope.  She is to occasional flutters that are infrequent and on evaluation.  Patient is maintained on flecainide therapy without noted side effects and acceptable EKG evaluation today.  Patient reports she  is lost 8 to 10 pounds over the past 3 months and is transitive better with her weight.  Patient's blood pressure is well controlled on current medication therapy with  in office today.  No new medication orders today.  We will see patient back in 1 year per request.    Follow Up: 1 year follow-up      Thank you for allowing me to participate in the care of your patient. Please to not hesitate to contact me with additional questions or concerns.      Electronically signed by RUSSELL Elliott, 07/24/20, 3:07 PM.

## 2020-12-07 RX ORDER — CARVEDILOL 12.5 MG/1
TABLET ORAL
Qty: 180 TABLET | Refills: 3 | Status: SHIPPED | OUTPATIENT
Start: 2020-12-07

## 2021-08-06 ENCOUNTER — OFFICE VISIT (OUTPATIENT)
Dept: CARDIOLOGY | Facility: CLINIC | Age: 61
End: 2021-08-06

## 2021-08-06 VITALS
HEART RATE: 69 BPM | WEIGHT: 213 LBS | DIASTOLIC BLOOD PRESSURE: 62 MMHG | RESPIRATION RATE: 20 BRPM | HEIGHT: 63 IN | SYSTOLIC BLOOD PRESSURE: 148 MMHG | BODY MASS INDEX: 37.74 KG/M2

## 2021-08-06 DIAGNOSIS — R00.2 PALPITATIONS: ICD-10-CM

## 2021-08-06 DIAGNOSIS — I48.0 PAF (PAROXYSMAL ATRIAL FIBRILLATION) (HCC): Primary | ICD-10-CM

## 2021-08-06 PROCEDURE — 99214 OFFICE O/P EST MOD 30 MIN: CPT | Performed by: PHYSICIAN ASSISTANT

## 2021-08-06 PROCEDURE — 93000 ELECTROCARDIOGRAM COMPLETE: CPT | Performed by: PHYSICIAN ASSISTANT

## 2021-08-06 RX ORDER — IRBESARTAN 300 MG/1
300 TABLET ORAL DAILY
COMMUNITY

## 2021-08-06 RX ORDER — AMLODIPINE BESYLATE 5 MG/1
5 TABLET ORAL DAILY
COMMUNITY

## 2021-08-06 RX ORDER — FUROSEMIDE 20 MG/1
10 TABLET ORAL DAILY PRN
COMMUNITY

## 2021-08-06 NOTE — PROGRESS NOTES
Cardiac Electrophysiology Outpatient Follow Up Note            [facility] at [facility]    Follow Up Office Visit      Annabel Sorensen  [id number]  07/24/2020    Primary Care Physician: Esteban Garcia MD        Subjective     Chief Complaint:   Chief Complaint   Patient presents with   • Atrial Fibrillation     Problem list:    1. Paroxysmal Atrial Fibrillation/PAT diagnosed 2007              A. CHADSVasc = 2              B. History of Flecainide Rx              C. PVA 1/16/07              D. Remote normal stress test - data deficit              E. Echo 9/26/2017 normal LV function, impaired relaxation with diastolic dysfunction.   2.  Essential HTN  3. Moderate Obesity  4. Anxiety  5. Chronic headaches  6.  Situational depression anxiety  7. Surgical History              A. Left nasal resection              B. Benign left cyst removal              C. Left brain meningioma with resection              D. BURT/BSO              E. axilllary cyst removal               F. cholecystectomy    History of Present Illness:   Mrs. Annabel Sorensen is a 61 y.o. female who presents to the electrophysiology clinic for follow up of of her history of atrial fibrillation and atrial tachycardia status post ablation in 2007 per Dr. Washington.  Patient is currently maintained on flecainide therapy .  Since we last saw the pt, pt denies any AF episodes, SOB, CP, LH, and dizziness.  She however is going through a very difficult time as she has lost a brother as well as a sister in the past year.  She is under tremendous amount of stress she is feeling some situational depressed anxiety.  She is not eating well not drinking of water with this she has had some marginal blood pressure associate some dizziness.  She has not had chest pain chest angina.  She does not think she has had any atrial fibrillation but she certainly feel that she is having quite a bit of anxiety.  Review of  Systems:   Constitutional: Question depression anxiety due to loss of family members   eyes: No visual loss, blurred vision, double vision, yellow sclerae.  ENT: No headaches, hearing loss, vertigo, congestion or sore throat.   Cardiovascular: Per HPI  Respiratory: No cough or wheezing, no sputum production, no hematemesis   Gastrointestinal: No abdominal pain, no nausea, vomiting, constipation, diarrhea, melena.   Genitourinary: No dysuria, hematuria or increased frequency.  Musculoskeletal:  No gait disturbance, weakness or joint pain or stiffness  Integumentary: No rashes, urticaria, ulcers or sores.   Neurological: No headache, dizziness, syncope, paralysis, ataxia, no prior CVA/TIA  Psychiatric: Recent situation depression anxiety i  Endocrine: No diaphoresis, cold or heat intolerance. No polyuria or polydipsia.   Hematologic/Lymphatic: No anemia, abnormal bruising or bleeding. No history of DVT/PE.      Past Medical History:   Past Medical History:   Diagnosis Date   • Anxiety    • Arrhythmia    • Chest pain    • Chronic headaches     of unknown etiology   • Hernia of pelvic floor    • Hypertension    • Obesity    • Optic neuritis    • PAF (paroxysmal atrial fibrillation) (CMS/HCC)    • Sleep apnea     c-pap recently recalled, so unable to use       Past Surgical History:   Past Surgical History:   Procedure Laterality Date   • BRAIN SURGERY      Incidental left brain meningioma with removal 1998   • BREAST SURGERY Left     benign left breast cyst removal   • EAR TUBES Bilateral    • HYSTERECTOMY      BURT/BSO   • OTHER SURGICAL HISTORY      Left nasal cyst resection 1969; recurrent left nasal cyst resection 1972   • OTHER SURGICAL HISTORY      Remote tubal pregnancy/resection 1994   • OTHER SURGICAL HISTORY      Benign right axillary cyst removal ? 2002   • OTHER SURGICAL HISTORY      Pulmonary vein ablation 2007       Family History:   Family History   Problem Relation Age of Onset   • Stroke Mother          afib   • Cancer Father         lung       Social History:   Social History     Socioeconomic History   • Marital status:      Spouse name: Not on file   • Number of children: Not on file   • Years of education: Not on file   • Highest education level: Not on file   Tobacco Use   • Smoking status: Never Smoker   • Smokeless tobacco: Never Used   Vaping Use   • Vaping Use: Never used   Substance and Sexual Activity   • Alcohol use: Not Currently     Alcohol/week: 1.0 - 2.0 standard drinks     Types: 1 - 2 Standard drinks or equivalent per week     Comment: monthly   • Drug use: No   • Sexual activity: Defer       Medications:     Current Outpatient Medications:   •  amLODIPine (NORVASC) 10 MG tablet, Take 10 mg by mouth Daily., Disp: , Rfl:   •  aspirin 81 MG EC tablet, Take 81 mg by mouth Daily., Disp: , Rfl:   •  carvedilol (COREG) 12.5 MG tablet, Take 1 tablet by mouth twice daily, Disp: 180 tablet, Rfl: 3  •  cholecalciferol (VITAMIN D3) 1000 units tablet, Take 2,000 Units by mouth Daily., Disp: , Rfl:   •  clonazePAM (KlonoPIN) 1 MG tablet, Take 1 mg by mouth 2 (Two) Times a Day., Disp: , Rfl:   •  DULoxetine (CYMBALTA) 60 MG capsule, Take 60 mg by mouth Daily., Disp: , Rfl:   •  flecainide (TAMBOCOR) 100 MG tablet, Take 100 mg by mouth 2 (Two) Times a Day., Disp: , Rfl:   •  fluticasone (FLONASE) 50 MCG/ACT nasal spray, 2 sprays into each nostril Daily., Disp: , Rfl:   •  furosemide (LASIX) 20 MG tablet, Take 10 mg by mouth Daily As Needed. If gained 4lbs, Disp: , Rfl:   •  irbesartan (AVAPRO) 300 MG tablet, Take 300 mg by mouth Daily., Disp: , Rfl:   •  naproxen sodium (ALEVE) 220 MG tablet, Take 220 mg by mouth As Needed., Disp: , Rfl:   •  nitroglycerin (NITROSTAT) 0.4 MG SL tablet, Place 0.4 mg under the tongue Every 5 (Five) Minutes As Needed for Chest Pain. Take no more than 3 doses in 15 minutes., Disp: , Rfl:     Allergies:   Allergies   Allergen Reactions   • Ibuprofen    • Sulfa Antibiotics   "      Objective     Physical Exam:  Vital Signs:   Vitals:    08/06/21 1306   BP: 148/62   BP Location: Left arm   Patient Position: Sitting   Pulse: 69   Resp: 20   Weight: 96.6 kg (213 lb)   Height: 160 cm (63\")     Constitutional: No fevers or chills, no recent weight gain or weight loss or fatigue  Eyes: No visual loss, blurred vision, double vision, yellow sclerae.  ENT: No headaches, hearing loss, vertigo, congestion or sore throat.   Cardiovascular: Per HPI  Respiratory: No cough or wheezing, no sputum production, no hematemesis   Gastrointestinal: No abdominal pain, no nausea, vomiting, constipation, diarrhea, melena.   Genitourinary: No dysuria, hematuria or increased frequency.  Musculoskeletal:  No gait disturbance, weakness or joint pain or stiffness  Integumentary: No rashes, urticaria, ulcers or sores.   Neurological: No headache, dizziness, syncope, paralysis, ataxia, no prior CVA/TIA  Psychiatric: No anxiety, or depression  Endocrine: No diaphoresis, cold or heat intolerance. No polyuria or polydipsia.   Hematologic/Lymphatic: No anemia, abnormal bruising or bleeding. No history of DVT/PE.          Cardiac Testing:     I personally viewed and interpreted the patient's EKG/Telemetry/lab data      ECG 12 Lead    Date/Time: 8/6/2021 1:26 PM  Performed by: Venu Samayoa PA  Authorized by: Venu Samayoa PA   Rhythm: sinus rhythm  Rate: normal  ST Segments: ST segments normal  T Waves: T waves normal  QRS axis: normal    Clinical impression: normal ECG                Assessment & Plan      Diagnoses and all orders for this visit:    1. PAF (paroxysmal atrial fibrillation) (CMS/HCC) (Primary)    2. Palpitations      Plan:   1. AFib: Remote PVA 2001, Flecainide therapy. EKG Stable. Monitor for toxicity.   2. AT: No recurrent events.   3. HTN: Controlled on Coreg, Hctz and amlodipine we have recommended she decrease her amlodipine to 5 mg daily because she has had a marginal blood pressure.  4. " Anticoagulation: Chadsvasc=2, ASA, prn Eliquis       Decrease amlodipine to 5 mg daily  Discussed the need for follow-up with her PCP, and therapist for her  situational depression/anxiety  If any recurrent palpitations recommend she can wear a ZIO monitor she will call and let us know if her symptoms progress  Follow up 6 months   Electronically signed by JOAN Ventura, 08/06/21, 12:15 PM EDT.

## 2022-09-02 ENCOUNTER — OFFICE VISIT (OUTPATIENT)
Dept: CARDIOLOGY | Facility: CLINIC | Age: 62
End: 2022-09-02

## 2022-09-02 VITALS
HEIGHT: 63 IN | BODY MASS INDEX: 36.32 KG/M2 | HEART RATE: 84 BPM | DIASTOLIC BLOOD PRESSURE: 80 MMHG | SYSTOLIC BLOOD PRESSURE: 124 MMHG | OXYGEN SATURATION: 95 % | WEIGHT: 205 LBS

## 2022-09-02 DIAGNOSIS — G47.33 OBSTRUCTIVE SLEEP APNEA SYNDROME: ICD-10-CM

## 2022-09-02 DIAGNOSIS — F43.21 GRIEF REACTION: ICD-10-CM

## 2022-09-02 DIAGNOSIS — I48.0 PAF (PAROXYSMAL ATRIAL FIBRILLATION): Primary | ICD-10-CM

## 2022-09-02 DIAGNOSIS — I10 PRIMARY HYPERTENSION: ICD-10-CM

## 2022-09-02 PROCEDURE — 99213 OFFICE O/P EST LOW 20 MIN: CPT | Performed by: INTERNAL MEDICINE

## 2022-09-02 NOTE — PROGRESS NOTES
"        Encounter Date:09/02/2022      Patient ID: Annabel Sorensen is a 62 y.o. female.    Esteban Garcia MD    Chief Complaint: PAF (paroxysmal atrial fibrillation)      PROBLEM LIST:  Patient Active Problem List    Diagnosis Date Noted   • PAF (paroxysmal atrial fibrillation) ([facility])      Priority: High     Note Last Updated: 9/11/2017     PAF/PAT:  A.Hospitalization in 09/2007 secondary to recurrent rapid palpitations  B.Initiation on Tambocor therapy with slight improvement  C. Pulmonary vein isolation procedure 01/16/07  D. 12 Lead EKG: Normal sinus rhythm with a ventricular rate of 70 BPM. Single PAC noted     • Sleep apnea      Priority: Low   • Hypertension      Priority: Low   • Obesity    • Anxiety    • Optic neuritis    • Chronic headaches      Note Last Updated: 9/11/2017     of unknown etiology     • Chest pain      Note Last Updated: 9/11/2017      Gated Adenosine Cardiolite 09/22/07 with normal EF 76% and no evidence of ischemia, baseline atrial tachycardia noted                 History of Present Illness  Patient presents today for follow-up with a history of atrial fibrillation status post PVA.  She returns today for scheduled follow-up.  Since we saw her last she has been under a great deal of stress due to death deaths and illness in the family.  Since then she has had a few episodes of \"quivering\" in her chest.  This is not consistent with her previous A. fib.  She does not check her blood pressure regularly at home.  She now has a new CPAP and is compliant with therapy.  She is compliant with her current medical regimen and reports no significant adverse side effects    Allergies   Allergen Reactions   • Sulfa Antibiotics Rash   • Ibuprofen Nausea Only       Current Outpatient Medications   Medication Instructions   • amLODIPine (NORVASC) 5 mg, Oral, Daily   • aspirin 81 mg, Oral, Daily   • carvedilol (COREG) 12.5 MG tablet Take 1 tablet by mouth twice daily   • cholecalciferol (VITAMIN " "D3) 2,000 Units, Oral, Daily   • clonazePAM (KLONOPIN) 1 mg, Oral, 2 Times Daily   • DULoxetine (CYMBALTA) 60 mg, Oral, Daily   • flecainide (TAMBOCOR) 100 mg, Oral, 2 Times Daily   • fluticasone (FLONASE) 50 MCG/ACT nasal spray 2 sprays, Nasal, Daily   • furosemide (LASIX) 10 mg, Oral, Daily PRN, If gained 4lbs    • irbesartan (AVAPRO) 300 mg, Oral, Daily   • naproxen sodium (ALEVE) 220 mg, Oral, As Needed   • nitroglycerin (NITROSTAT) 0.4 mg, Sublingual, Every 5 Minutes PRN, Take no more than 3 doses in 15 minutes.        .    Objective:     /80 (BP Location: Right arm, Patient Position: Sitting, Cuff Size: Adult)   Pulse 84   Ht 160 cm (63\")   Wt 93 kg (205 lb)   SpO2 95%   BMI 36.31 kg/m²    Body mass index is 36.31 kg/m².     Constitutional:       Appearance: Well-developed.   Pulmonary:      Effort: Pulmonary effort is normal. No respiratory distress.      Breath sounds: Normal breath sounds. No wheezing. No rales.      Comments: Bases clear  Chest:      Chest wall: Not tender to palpatation.   Cardiovascular:      Normal rate. Regular rhythm.      Murmurs: There is no murmur.      No gallop. No click. No rub.   Pulses:     Intact distal pulses.   Musculoskeletal: Normal range of motion.       Lab Review:       Procedures               Assessment:      Diagnosis Plan   1. PAF (paroxysmal atrial fibrillation) ([facility])   maintaining sinus rhythm, stable QT QTC, continue flecainide at current dose   2. Primary hypertension   appears well managed on current medical regimen   3. Obstructive sleep apnea syndrome   CPAP therapy has been renewed and she is compliant   4. Grief reaction   she is planning to seek counseling and I agree that this would benefit her.  I think her most recent discomfort in her chest is likely a stress reaction     Plan:     Stable cardiac status.  Continue current medications.   in 1 year, sooner as needed.  Thank you for allowing us to participate in the care of your patient. "     Electronically signed by JOAN Salguero, 09/02/22, 3:46 PM EDT.

## 2023-09-21 ENCOUNTER — TELEPHONE (OUTPATIENT)
Dept: CARDIOLOGY | Facility: CLINIC | Age: 63
End: 2023-09-21

## 2023-09-21 NOTE — TELEPHONE ENCOUNTER
Caller: Annabel Sorensen    Relationship to patient: Self    Best call back number: [phone]    Chief complaint: PT HAD TO CANCEL APPT FOR 9.22.23 IN [place] OFFICE WITH DR. JOSE(WAS CHANGED FROM ) DUE TO NOT BEING ABLE TO MAKE FRIDAYS ANYMORE FOR OTHER APPTS. PT STATES SHE HAD SEEN HIM PREVIOUSLY AND IS OK WITH SEEING HIM FOR 1 YR FU. R/S PT FOR 10.03.23 WITH MAXINE FENTON IN [facility] OFFICE, IF APPT IS NOT OK OR NEEDS TO BE CHANGED PLEASE REACH OUT TO PT TO ADVISE, THANK YOU    Type of visit: 1 YR FU    If rescheduling, when is the original appointment: 9.22.23

## 2023-10-05 ENCOUNTER — OFFICE VISIT (OUTPATIENT)
Dept: CARDIOLOGY | Facility: CLINIC | Age: 63
End: 2023-10-05
Payer: COMMERCIAL

## 2023-10-05 VITALS
OXYGEN SATURATION: 97 % | DIASTOLIC BLOOD PRESSURE: 72 MMHG | WEIGHT: 215 LBS | HEIGHT: 63 IN | BODY MASS INDEX: 38.09 KG/M2 | SYSTOLIC BLOOD PRESSURE: 118 MMHG | HEART RATE: 62 BPM

## 2023-10-05 DIAGNOSIS — I48.0 PAROXYSMAL ATRIAL FIBRILLATION: Primary | ICD-10-CM

## 2023-10-05 PROCEDURE — 93000 ELECTROCARDIOGRAM COMPLETE: CPT | Performed by: PHYSICIAN ASSISTANT

## 2023-10-05 PROCEDURE — 3074F SYST BP LT 130 MM HG: CPT | Performed by: PHYSICIAN ASSISTANT

## 2023-10-05 PROCEDURE — 3078F DIAST BP <80 MM HG: CPT | Performed by: PHYSICIAN ASSISTANT

## 2023-10-05 PROCEDURE — 99214 OFFICE O/P EST MOD 30 MIN: CPT | Performed by: PHYSICIAN ASSISTANT

## 2023-10-05 RX ORDER — POLYETHYLENE GLYCOL 3350 17 G/17G
17 POWDER, FOR SOLUTION ORAL DAILY
COMMUNITY
Start: 2023-09-28

## 2023-10-05 RX ORDER — PAROXETINE HYDROCHLORIDE 20 MG/1
20 TABLET, FILM COATED ORAL EVERY MORNING
COMMUNITY

## 2023-10-05 NOTE — PROGRESS NOTES
Patient ID: Annabel Sorensen is a 63 y.o. female.    Esteban Garcia MD    Chief Complaint: Atrial Fibrillation and Hypertension      PROBLEM LIST:  Patient Active Problem List    Diagnosis Date Noted    Sleep apnea     PAF (paroxysmal atrial fibrillation) ([facility])      Note Last Updated: 9/11/2017     PAF/PAT:  A.Hospitalization in 09/2007 secondary to recurrent rapid palpitations  B.Initiation on Tambocor therapy with slight improvement  C. Pulmonary vein isolation procedure 01/16/07  D. 12 Lead EKG: Normal sinus rhythm with a ventricular rate of 70 BPM. Single PAC noted      Hypertension     Obesity     Anxiety     Optic neuritis     Chronic headaches      Note Last Updated: 9/11/2017     of unknown etiology      Chest pain      Note Last Updated: 9/11/2017      Gated Adenosine Cardiolite 09/22/07 with normal EF 76% and no evidence of ischemia, baseline atrial tachycardia noted                 Atrial Fibrillation  Past medical history includes atrial fibrillation.   Hypertension    Pleasant 63-year-old female returns today for follow-up regarding atrial fibrillation.  She is status post remote PVA in 2007.  She was last seen by our office September 2, 2022.  Since that time she is doing quite well.  No A-fib.  Blood pressure remains controlled.  She has a lot of stress at home with family having health issues. Otherwise she is doing well. She walks for exercise and plays with her 3 year old grandson.     Allergies   Allergen Reactions    Sulfa Antibiotics Rash    Ibuprofen Nausea Only       Current Outpatient Medications   Medication Instructions    amLODIPine (NORVASC) 5 mg, Oral, Daily    aspirin 81 mg, Oral, Daily    carvedilol (COREG) 12.5 MG tablet Take 1 tablet by mouth twice daily    cholecalciferol (VITAMIN D3) 2,000 Units, Oral, Daily    clonazePAM (KLONOPIN) 1 mg, Oral, 2 Times Daily    DULoxetine (CYMBALTA) 60 mg, Oral, Daily    flecainide (TAMBOCOR) 100 mg, Oral, 2 Times Daily     "fluticasone (FLONASE) 50 MCG/ACT nasal spray 2 sprays, Nasal, Daily    furosemide (LASIX) 10 mg, Oral, Daily PRN, If gained 4lbs     irbesartan (AVAPRO) 300 mg, Oral, Daily    naproxen sodium (ALEVE) 220 mg, Oral, As Needed    nitroglycerin (NITROSTAT) 0.4 mg, Sublingual, Every 5 Minutes PRN, Take no more than 3 doses in 15 minutes.        .    Objective:     /72 (BP Location: Left arm, Patient Position: Sitting)   Pulse 62   Ht 160 cm (62.99\")   Wt 97.5 kg (215 lb)   SpO2 97%   BMI 38.10 kg/m²    Body mass index is 38.1 kg/m².     Constitutional:       Appearance: Well-developed.   Pulmonary:      Effort: Pulmonary effort is normal. No respiratory distress.      Breath sounds: Normal breath sounds. No wheezing. No rales.      Comments: Bases clear  Chest:      Chest wall: Not tender to palpatation.   Cardiovascular:      Normal rate. Regular rhythm.      Murmurs: There is no murmur.      No gallop.  No click. No rub.   Pulses:     Intact distal pulses.   Musculoskeletal: Normal range of motion.     Lab Review:         ECG 12 Lead    Date/Time: 10/5/2023 1:37 PM  Performed by: Venu Samayoa PA  Authorized by: Venu Samayoa PA   Comparison: compared with previous ECG from 9/2/2022  Rhythm: sinus rhythm  Rate: normal  Conduction: conduction normal  T Waves: T waves normal  QRS axis: normal    Clinical impression: non-specific ECG                   Assessment:      Diagnosis Plan   1. PAF (paroxysmal atrial fibrillation) ([facility])   maintaining sinus rhythm, stable QT QTC, continue flecainide at current dose. Chadsvasc=2. On Prn Eliquis.    2. Primary hypertension   appears well managed on current medical regimen   3. Obstructive sleep apnea syndrome   CPAP therapy has been renewed and she is compliant   4. Moderate Obesity       Plan:   Follow up Echo, Follow up as scheduled Jefry Vu.   Electronically signed by JOAN Ventura, 10/05/23, 1:37 PM EDT.   "

## 2023-12-07 ENCOUNTER — TELEPHONE (OUTPATIENT)
Dept: CARDIOLOGY | Facility: CLINIC | Age: 63
End: 2023-12-07
Payer: COMMERCIAL

## 2023-12-07 DIAGNOSIS — I48.0 PAROXYSMAL ATRIAL FIBRILLATION: Primary | ICD-10-CM

## 2023-12-07 NOTE — TELEPHONE ENCOUNTER
Pt states at her last visit that it was discussed that she have an ECHO and to try and get that scheduled through her PCP.  Pt called and stated that she would like us to have is [facility] and she prefers to have it done in [place].    Please advise

## 2024-02-05 ENCOUNTER — HOSPITAL ENCOUNTER (OUTPATIENT)
Dept: CARDIOLOGY | Facility: HOSPITAL | Age: 64
Discharge: HOME | End: 2024-02-05
Admitting: PHYSICIAN ASSISTANT
Payer: COMMERCIAL

## 2024-02-05 VITALS
BODY MASS INDEX: 37.89 KG/M2 | WEIGHT: 213.85 LBS | HEIGHT: 63 IN | DIASTOLIC BLOOD PRESSURE: 65 MMHG | SYSTOLIC BLOOD PRESSURE: 133 MMHG

## 2024-02-05 DIAGNOSIS — I48.0 PAROXYSMAL ATRIAL FIBRILLATION: ICD-10-CM

## 2024-02-05 LAB
BH CV ECHO MEAS - AO MAX PG: 5.9 MMHG
BH CV ECHO MEAS - AO MEAN PG: 4.1 MMHG
BH CV ECHO MEAS - AO ROOT DIAM: 3.5 CM
BH CV ECHO MEAS - AO V2 MAX: 121.6 CM/SEC
BH CV ECHO MEAS - AO V2 VTI: 33.4 CM
BH CV ECHO MEAS - AVA(I,D): 2.12 CM2
BH CV ECHO MEAS - EDV(CUBED): 11.1 ML
BH CV ECHO MEAS - EDV(MOD-SP2): 76 ML
BH CV ECHO MEAS - EDV(MOD-SP4): 82 ML
BH CV ECHO MEAS - EF(MOD-BP): 65 %
BH CV ECHO MEAS - EF(MOD-SP2): 65.8 %
BH CV ECHO MEAS - EF(MOD-SP4): 67.1 %
BH CV ECHO MEAS - ESV(CUBED): 7.8 ML
BH CV ECHO MEAS - ESV(MOD-SP2): 26 ML
BH CV ECHO MEAS - ESV(MOD-SP4): 27 ML
BH CV ECHO MEAS - FS: 11.2 %
BH CV ECHO MEAS - IVS/LVPW: 0.43 CM
BH CV ECHO MEAS - IVSD: 0.95 CM
BH CV ECHO MEAS - LA DIMENSION: 4 CM
BH CV ECHO MEAS - LAT PEAK E' VEL: 10 CM/SEC
BH CV ECHO MEAS - LV DIASTOLIC VOL/BSA (35-75): 41.2 CM2
BH CV ECHO MEAS - LV MASS(C)D: 120.2 GRAMS
BH CV ECHO MEAS - LV MAX PG: 3.1 MMHG
BH CV ECHO MEAS - LV MEAN PG: 1.75 MMHG
BH CV ECHO MEAS - LV SYSTOLIC VOL/BSA (12-30): 13.6 CM2
BH CV ECHO MEAS - LV V1 MAX: 87.4 CM/SEC
BH CV ECHO MEAS - LV V1 VTI: 22 CM
BH CV ECHO MEAS - LVIDD: 2.23 CM
BH CV ECHO MEAS - LVIDS: 1.98 CM
BH CV ECHO MEAS - LVOT AREA: 3.2 CM2
BH CV ECHO MEAS - LVOT DIAM: 2.03 CM
BH CV ECHO MEAS - LVPWD: 2.19 CM
BH CV ECHO MEAS - MED PEAK E' VEL: 7 CM/SEC
BH CV ECHO MEAS - MV A MAX VEL: 43.9 CM/SEC
BH CV ECHO MEAS - MV DEC SLOPE: 571.5 CM/SEC2
BH CV ECHO MEAS - MV E MAX VEL: 77.5 CM/SEC
BH CV ECHO MEAS - MV E/A: 1.76
BH CV ECHO MEAS - MV MAX PG: 1.86 MMHG
BH CV ECHO MEAS - MV MEAN PG: 0.62 MMHG
BH CV ECHO MEAS - MV V2 VTI: 26 CM
BH CV ECHO MEAS - MVA(VTI): 2.7 CM2
BH CV ECHO MEAS - PA ACC SLOPE: 2271 CM/SEC2
BH CV ECHO MEAS - PA ACC TIME: 0.04 SEC
BH CV ECHO MEAS - PA V2 MAX: 89.2 CM/SEC
BH CV ECHO MEAS - RAP SYSTOLE: 3 MMHG
BH CV ECHO MEAS - SI(MOD-SP2): 25.1 ML/M2
BH CV ECHO MEAS - SI(MOD-SP4): 27.6 ML/M2
BH CV ECHO MEAS - SV(LVOT): 70.9 ML
BH CV ECHO MEAS - SV(MOD-SP2): 50 ML
BH CV ECHO MEAS - SV(MOD-SP4): 55 ML
BH CV ECHO MEAS - TAPSE (>1.6): 2.7 CM
BH CV ECHO MEASUREMENTS AVERAGE E/E' RATIO: 9.12
BH CV XLRA - RV BASE: 4.5 CM
BH CV XLRA - RV LENGTH: 8.4 CM
BH CV XLRA - RV MID: 3.3 CM
BH CV XLRA - TDI S': 14.2 CM/SEC
IVRT: 60 MS
LEFT ATRIUM VOLUME INDEX: 34.7 ML/M2

## 2024-02-05 PROCEDURE — 93306 TTE W/DOPPLER COMPLETE: CPT | Performed by: INTERNAL MEDICINE

## 2024-02-05 PROCEDURE — 93306 TTE W/DOPPLER COMPLETE: CPT

## 2024-02-19 ENCOUNTER — TELEPHONE (OUTPATIENT)
Dept: CARDIOLOGY | Facility: CLINIC | Age: 64
End: 2024-02-19
Payer: COMMERCIAL

## 2024-02-19 NOTE — TELEPHONE ENCOUNTER
Appointment Request ()  (Newest Message First)  Harley Fernandes RegSched Rep routed conversation to You; Jasper Toure, [id number] minutes ago (10:21 AM)     Annabel Sorensen  P Mge [facility]  PoolYesterday (12:22 AM)       Please have Dr Weathers to let me know if results of echocardiogram.  I googled it but just nervous about it. Thanks        Bridgette Dowling, RN, BSN  Annabel Sorensen5 days ago     KT  Dr. Weathers does not address these issues.  You will need to contact your primary care physician.       Annabel Silvaill  P Mge [facility]  Pool6 days ago       Appointment Request From: Annabel Sorensen     With Provider: Jason Weathers [[facility] CARDIOLOGY]     Preferred Date Range: From 2/13/2024 To 2/26/2024     Preferred Times: Any     Reason: To address the following health maintenance concerns.  Annual Gynecologic Pelvic And Breast Exam  Mammogram  Pap Smear     Comments:  I do not have a Dr for a gynecologist

## 2024-02-19 NOTE — TELEPHONE ENCOUNTER
I tried to call the patient but she did not answer. I left a message for her to call us back at the office.

## 2024-02-23 ENCOUNTER — TELEPHONE (OUTPATIENT)
Dept: CARDIOLOGY | Facility: CLINIC | Age: 64
End: 2024-02-23
Payer: COMMERCIAL

## 2024-02-23 NOTE — TELEPHONE ENCOUNTER
Called PT with results of recent ECHO.  Per JOAN Willoughby, the pts echo was normal.  No answer, LVM

## 2024-06-13 NOTE — PROGRESS NOTES
Annabel Sorensen  [id number]  1960  [phone]      2024      [facility] CARDIOLOGY     Referring Provider: No ref. provider found     Esteban Garcia MD  [address] [place] [address]    Chief Complaint   Patient presents with    Paroxysmal atrial fibrillation       Problem List  1. Paroxysmal Atrial Fibrillation/PAT                A. CHADSVasc = 2              B. History of Flecainide Rx              C. Remote PVA 07              D. Remote normal stress test - data deficit  2. HTN  3. Obesity  4. Anxiety  5. Optic neuritis  6. Chronic Headaches  7. JOELLEN  8. Obesity  9. Surgical History              A. Left nasal resection              B. Benign left cyst removal              C. Left brain meningioma with resection              D. BURT/BSO              E. axilllary cyst removal               F. cholecystectomy      History of Present Illness   Annabel Sorensen is a 64 y.o. female who presents to my electrophysiology clinic for follow up of PAF.  Since her last visit she has been doing relatively well.  Recently she has had more palpitations.  Episodes are random and lasting a few minutes at a time.  She denies chest pain, shortness of breath, lightheadedness, dizziness and syncope.  She has a family history of strokes, so she prefers to be cautious.  She tried Eliquis but had given reaction and diarrhea.  Blood pressure elevated today.  Her daughter recently had a  and she has been helping.  She has not been getting adequate sleep.    Outpatient Medications Marked as Taking for the 24 encounter (Office Visit) with Brady Weiner MD   Medication Sig Dispense Refill    amitriptyline (ELAVIL) 25 MG tablet Take 1 tablet by mouth Every Night.      amLODIPine (NORVASC) 5 MG tablet Take 1 tablet by mouth Daily.      aspirin 81 MG EC tablet Take 1 tablet by mouth Daily.      carvedilol (COREG) 12.5 MG tablet Take 1 tablet by mouth twice daily 180 tablet 3     "cholecalciferol (VITAMIN D3) 1000 units tablet Take 2 tablets by mouth Daily.      clonazePAM (KlonoPIN) 1 MG tablet Take 1 tablet by mouth 2 (Two) Times a Day.      flecainide (TAMBOCOR) 100 MG tablet Take 1 tablet by mouth 2 (Two) Times a Day.      fluticasone (FLONASE) 50 MCG/ACT nasal spray 2 sprays into the nostril(s) as directed by provider Daily.      furosemide (LASIX) 20 MG tablet Take 0.5 tablets by mouth Daily As Needed. If gained 4lbs      irbesartan (AVAPRO) 300 MG tablet Take 1 tablet by mouth Daily.      naproxen sodium (ALEVE) 220 MG tablet Take 1 tablet by mouth As Needed.      nitroglycerin (NITROSTAT) 0.4 MG SL tablet Place 1 tablet under the tongue Every 5 (Five) Minutes As Needed for Chest Pain. Take no more than 3 doses in 15 minutes.      PARoxetine (PAXIL) 20 MG tablet Take 1 tablet by mouth Every Morning.      polyethylene glycol (MIRALAX) 17 GM/SCOOP powder Take 17 g by mouth Daily.              Physical Exam  Vitals:    06/14/24 1402 06/14/24 1433   BP: (!) 184/98 168/96   BP Location: Left arm Left arm   Patient Position: Sitting Sitting   Pulse: 72    SpO2: 95%    Weight: 98.9 kg (218 lb)    Height: 160 cm (63\")      GENERAL: Well-developed, well-nourished patient in no acute distress.  HEENT: NC, AC, PERRLA. MMM  NECK: No JVD. No carotid bruits auscultated.  LUNGS: Clear to auscultation bilaterally.  CARDIOVASCULAR: RRR No murmurs, gallops or rubs noted.   ABDOMEN: Soft, nontender. Positive bowel sounds.  MUSCULOSKELETAL: No gross deformities. No clubbing, cyanosis  EXT: pulses intact, No edema  SKIN: Pink, warm  Neuro: Nonfocal exam. Gait intact    Diagnostic Data    ECG 12 Lead    Date/Time: 6/14/2024 2:31 PM  Performed by: Lynne Arora APRN    Authorized by: Lynne Arora APRN  Comparison: compared with previous ECG from 10/5/2023  Similar to previous ECG  Rhythm: sinus rhythm  Rate: normal  BPM: 67  QRS axis: normal    Clinical impression: normal ECG          No results found " "for: \"GLUCOSE\", \"CALCIUM\", \"NA\", \"K\", \"CO2\", \"CL\", \"BUN\", \"CREATININE\", \"EGFRIFAFRI\", \"EGFRIFNONA\", \"BCR\", \"ANIONGAP\"  Lab Results   Component Value Date    WBC 11.04 (H) 2023    HGB 13.5 2023    HCT 43.0 2023    MCV 89 2023     2023     No results found for: \"INR\", \"PROTIME\"  No results found for: \"TSH\", \"K1XMYVE\", \"O0JTPQH\", \"THYROIDAB\"    I personally viewed and interpreted the patient's EKG/Telemetry/lab data      Assessment and Plan  Diagnoses and all orders for this visit:    1. PAF (paroxysmal atrial fibrillation) (Primary)  -     Holter Monitor - 72 Hour Up To 15 Days; Future    2. Primary hypertension    3. Obstructive sleep apnea syndrome    Other orders  -     ECG 12 Lead        Paroxysmal atrial fibrillation  -[id number] 2, anticoagulated with  -Patient on flecainide and carvedilol  -Patient is having occasional palpitations that are lasting a few minutes at a time.  She is on aware if they are related to anxiety or her atrial fibrillation.  She was given Eliquis samples at her last visit and was unable to tolerate due to itching and diarrhea. 14-day monitor ordered to evaluate palpitations.  Xarelto samples given to see if she can tolerate.     Hypertension  -Elevated today. Recheck 168/96. Patient has been helping take care of her daughters  and hasn't been getting adequate sleep. She is going to take an extra dose of amlodipine and recheck BP today. She will follow up with PCP if BP remains elevated.     JOELLEN  -Compliant with CPAP      Body mass index is 38.62 kg/m².    Follow-Up  Return in about 6 months (around 2024).    Thank you for allowing me to participate in the care of your patient. Please to not hesitate to contact me with additional questions or concerns.     Lynne Arora, APRN      "

## 2024-06-14 ENCOUNTER — OFFICE VISIT (OUTPATIENT)
Dept: CARDIOLOGY | Facility: CLINIC | Age: 64
End: 2024-06-14
Payer: COMMERCIAL

## 2024-06-14 VITALS
HEIGHT: 63 IN | OXYGEN SATURATION: 95 % | SYSTOLIC BLOOD PRESSURE: 168 MMHG | BODY MASS INDEX: 38.62 KG/M2 | DIASTOLIC BLOOD PRESSURE: 96 MMHG | HEART RATE: 72 BPM | WEIGHT: 218 LBS

## 2024-06-14 DIAGNOSIS — I48.0 PAF (PAROXYSMAL ATRIAL FIBRILLATION): Primary | Chronic | ICD-10-CM

## 2024-06-14 DIAGNOSIS — I10 PRIMARY HYPERTENSION: Chronic | ICD-10-CM

## 2024-06-14 DIAGNOSIS — G47.33 OBSTRUCTIVE SLEEP APNEA SYNDROME: Chronic | ICD-10-CM

## 2024-06-14 RX ORDER — AMITRIPTYLINE HYDROCHLORIDE 25 MG/1
25 TABLET, FILM COATED ORAL NIGHTLY
COMMUNITY
Start: 2024-06-02

## 2024-07-02 ENCOUNTER — TELEPHONE (OUTPATIENT)
Dept: CARDIOLOGY | Facility: CLINIC | Age: 64
End: 2024-07-02
Payer: COMMERCIAL

## 2024-07-02 NOTE — TELEPHONE ENCOUNTER
Pt notified and aware that Lynne Arora PA-C sent in Xarelto to [facility] pharmacy. She is also aware to take Naproxen sparingly.

## 2024-07-26 ENCOUNTER — TELEPHONE (OUTPATIENT)
Dept: CARDIOLOGY | Facility: CLINIC | Age: 64
End: 2024-07-26
Payer: COMMERCIAL

## 2024-07-26 DIAGNOSIS — I48.0 PAF (PAROXYSMAL ATRIAL FIBRILLATION): Primary | ICD-10-CM

## 2024-07-26 NOTE — TELEPHONE ENCOUNTER
Spoke to Ms Sorensen on the phone. She says she is still having palpitations daily sometimes twice a day. When she triggered on the monitor heart rate <100 bpm. She is feeling fatigued. Her BP has been within normal range and HR around 60. She has been compliant with Flecainide, Xarelto, and coreg. She is taking lasix daily. On top of feeling fatigued she gets occasional LH and dizziness. I told her she could cut back on her lasix or try cutting back on her amlodipine since she feels that her BP is low. I will talk to Dr. Weiner and see if he has any other suggestions.   RUSSELL Dos Santos    Regarding: [place] patch results   Contact: [phone]  ----- Message from Phyllis Hare RN sent at 7/24/2024  9:59 AM EDT -----       ----- Message from Annabel Sorensen to Lynne Arora APRN sent at 7/24/2024  9:30 AM -----   Please let me know about test results.  Thanks Ricarda

## 2024-08-06 NOTE — PROGRESS NOTES
Annabel Sorensen  [id number]  1960  [phone]    8/9/2024      [facility] CARDIOLOGY     Referring Provider: No ref. provider found     Esteban Garcia MD  [address] [place] [address]    Chief Complaint   Patient presents with    PAF (paroxysmal atrial fibrillation)       Problem List  1. Paroxysmal Atrial Fibrillation/PAT                A. CHADSVasc = 2 (sex, HTN) anticoagulated with Xarelto              B. History of Flecainide Rx              C. Remote PVA 1/16/07              D. Remote normal stress test - data deficit  E. Holter Monitor, 7/23/24: Predominantly NSR. PAC/PVC      occurred rarely. Junctional rhythm noted with HR at 60-70bpm.   2. HTN  3. Obesity  4. Anxiety  5. Optic neuritis  6. Chronic Headaches  7. JOELLEN  8. Obesity  9. Surgical History              A. Left nasal resection              B. Benign left cyst removal              C. Left brain meningioma with resection              D. BURT/BSO              E. axilllary cyst removal               F. cholecystectomy      History of Present Illness   Annabel Sorensen is a 64 y.o. female who presents to my electrophysiology clinic for follow up of PAF.  Since we last saw her, she is doing a bit better but still having dizziness and head fogginess.     Patient currently on three anxiolytic meds. Home heart rate and BP relatively well controlled. (HR 60s, SBP 110s)    Outpatient Medications Marked as Taking for the 8/9/24 encounter (Office Visit) with Brady Weiner MD   Medication Sig Dispense Refill    amitriptyline (ELAVIL) 25 MG tablet Take 1 tablet by mouth Every Night.      amLODIPine (NORVASC) 5 MG tablet Take 1 tablet by mouth Daily. 0.5 tablet daily      aspirin 81 MG EC tablet Take 1 tablet by mouth Daily.      cholecalciferol (VITAMIN D3) 1000 units tablet Take 2 tablets by mouth Daily.      clonazePAM (KlonoPIN) 1 MG tablet Take 1 tablet by mouth 2 (Two) Times a Day.      flecainide (TAMBOCOR) 100 MG  "tablet Take 1 tablet by mouth 2 (Two) Times a Day.      fluticasone (FLONASE) 50 MCG/ACT nasal spray 2 sprays into the nostril(s) as directed by provider Daily.      furosemide (LASIX) 20 MG tablet Take 0.5 tablets by mouth Daily As Needed. If gained 4lbs      irbesartan (AVAPRO) 300 MG tablet Take 1 tablet by mouth Daily.      naproxen sodium (ALEVE) 220 MG tablet Take 1 tablet by mouth As Needed.      nitroglycerin (NITROSTAT) 0.4 MG SL tablet Place 1 tablet under the tongue Every 5 (Five) Minutes As Needed for Chest Pain. Take no more than 3 doses in 15 minutes.      PARoxetine (PAXIL) 20 MG tablet Take 1 tablet by mouth Every Morning.      polyethylene glycol (MIRALAX) 17 GM/SCOOP powder Take 17 g by mouth Daily.      rivaroxaban (XARELTO) 20 MG tablet Take 1 tablet by mouth Daily. 30 tablet 6    [DISCONTINUED] carvedilol (COREG) 12.5 MG tablet Take 1 tablet by mouth twice daily 180 tablet 3            Physical Exam  Vitals:    08/09/24 1249   BP: 142/70   BP Location: Right arm   Patient Position: Sitting   Pulse: 70   SpO2: 92%   Weight: 98.9 kg (218 lb)   Height: 157.5 cm (62\")       GENERAL: Well-developed, well-nourished patient in no acute distress.  HEENT: NC, AC, PERRLA. MMM  NECK: No JVD. No carotid bruits auscultated.  LUNGS: Clear to auscultation bilaterally.  CARDIOVASCULAR: RRR No murmurs, gallops or rubs noted.   ABDOMEN: Soft, nontender. Positive bowel sounds.  MUSCULOSKELETAL: No gross deformities. No clubbing, cyanosis  EXT: pulses intact, No edema  SKIN: Pink, warm  Neuro: Nonfocal exam. Gait intact    Diagnostic Data  Sinus rhythm 1* AVB      QTc 431    No results found for: \"GLUCOSE\", \"CALCIUM\", \"NA\", \"K\", \"CO2\", \"CL\", \"BUN\", \"CREATININE\", \"EGFRIFAFRI\", \"EGFRIFNONA\", \"BCR\", \"ANIONGAP\"  Lab Results   Component Value Date    WBC 11.04 (H) 09/07/2023    HGB 13.5 09/07/2023    HCT 43.0 09/07/2023    MCV 89 09/07/2023     09/07/2023     No results found for: \"INR\", " "\"PROTIME\"  No results found for: \"TSH\", \"K9WKNZH\", \"P5DEOCW\", \"THYROIDAB\"    I personally viewed and interpreted the patient's EKG/Telemetry/lab data      Assessment and Plan  Diagnoses and all orders for this visit:    1. PAF (paroxysmal atrial fibrillation) (Primary)    2. Primary hypertension    3. Obstructive sleep apnea syndrome    Other orders  -     carvedilol (COREG) 6.25 MG tablet; Take 1 tablet by mouth 2 (Two) Times a Day.  Dispense: 180 tablet; Refill: 3          Paroxysmal atrial fibrillation  -[id number] 2 (sex, HTN),  anticoagulated with Xarelto  -Patient on flecainide and carvedilol  -Holter, 7/23/24: Predominantly NSR. PAC/PVC occurred rarely.   -downtitrate carvedilol to 6.25mg po bid    Hypertension  -Well controlled, continue current management.     JOELLEN  -Compliant with CPAP    Dizziness  Discussed discussing with her provider in re: coming off of one of her anxiolytic medications: amitriptyline, paroxetine, klonopin. Would prefer to have her come off of klonopin if feasible.       Body mass index is 39.87 kg/m².    Follow-Up  Return in about 6 months (around 2/9/2025).    Thank you for allowing me to participate in the care of your patient. Please to not hesitate to contact me with additional questions or concerns.     Brady Weiner MD [facility]  Cardiac Electrophysiologist  [facility] / [facility]      "

## 2024-08-08 ENCOUNTER — TELEPHONE (OUTPATIENT)
Dept: CARDIOLOGY | Facility: CLINIC | Age: 64
End: 2024-08-08
Payer: COMMERCIAL

## 2024-08-09 ENCOUNTER — OFFICE VISIT (OUTPATIENT)
Dept: CARDIOLOGY | Facility: CLINIC | Age: 64
End: 2024-08-09
Payer: COMMERCIAL

## 2024-08-09 VITALS
WEIGHT: 218 LBS | DIASTOLIC BLOOD PRESSURE: 70 MMHG | HEART RATE: 70 BPM | OXYGEN SATURATION: 92 % | SYSTOLIC BLOOD PRESSURE: 142 MMHG | HEIGHT: 62 IN | BODY MASS INDEX: 40.12 KG/M2

## 2024-08-09 DIAGNOSIS — I48.0 PAF (PAROXYSMAL ATRIAL FIBRILLATION): Primary | Chronic | ICD-10-CM

## 2024-08-09 DIAGNOSIS — I10 PRIMARY HYPERTENSION: Chronic | ICD-10-CM

## 2024-08-09 DIAGNOSIS — G47.33 OBSTRUCTIVE SLEEP APNEA SYNDROME: Chronic | ICD-10-CM

## 2024-08-09 RX ORDER — CARVEDILOL 6.25 MG/1
6.25 TABLET ORAL 2 TIMES DAILY
Qty: 180 TABLET | Refills: 3 | Status: SHIPPED | OUTPATIENT
Start: 2024-08-09

## 2025-03-14 ENCOUNTER — OFFICE VISIT (OUTPATIENT)
Dept: CARDIOLOGY | Facility: CLINIC | Age: 65
End: 2025-03-14
Payer: COMMERCIAL

## 2025-03-14 VITALS
SYSTOLIC BLOOD PRESSURE: 160 MMHG | DIASTOLIC BLOOD PRESSURE: 86 MMHG | HEART RATE: 73 BPM | WEIGHT: 216 LBS | HEIGHT: 64 IN | BODY MASS INDEX: 36.88 KG/M2 | OXYGEN SATURATION: 97 %

## 2025-03-14 DIAGNOSIS — I10 PRIMARY HYPERTENSION: Chronic | ICD-10-CM

## 2025-03-14 DIAGNOSIS — I48.0 PAF (PAROXYSMAL ATRIAL FIBRILLATION): Primary | Chronic | ICD-10-CM

## 2025-03-14 PROCEDURE — 99214 OFFICE O/P EST MOD 30 MIN: CPT | Performed by: INTERNAL MEDICINE

## 2025-03-14 PROCEDURE — 3079F DIAST BP 80-89 MM HG: CPT | Performed by: INTERNAL MEDICINE

## 2025-03-14 PROCEDURE — 3077F SYST BP >= 140 MM HG: CPT | Performed by: INTERNAL MEDICINE

## 2025-03-14 RX ORDER — PREDNISONE 5 MG/1
5 TABLET ORAL DAILY
COMMUNITY
Start: 2025-02-14 | End: 2025-03-16

## 2025-03-17 PROCEDURE — 93000 ELECTROCARDIOGRAM COMPLETE: CPT | Performed by: INTERNAL MEDICINE

## 2025-03-18 NOTE — PROGRESS NOTES
Annabel Sorensen  [id number]  1960  [phone]    [facility] CARDIOLOGY     Referring Provider: No ref. provider found     Esteban Garcia MD  [address] [place] [address]    Chief Complaint   Patient presents with    PAF (paroxysmal atrial fibrillation)       Problem List  1. Paroxysmal Atrial Fibrillation/PAT                A. CHADSVasc = 2 (sex, HTN) anticoagulated with Xarelto              B. History of Flecainide Rx              C. Remote PVA 1/16/07              D. Remote normal stress test - data deficit  E. Holter Monitor, 7/23/24: Predominantly NSR. PAC/PVC      occurred rarely. Junctional rhythm noted with HR at 60-70bpm.   2. HTN  3. Obesity  4. Anxiety  5. Optic neuritis  6. Chronic Headaches  7. JOELLEN  8. Obesity  9. Surgical History              A. Left nasal resection              B. Benign left cyst removal              C. Left brain meningioma with resection              D. BURT/BSO              E. axilllary cyst removal               F. cholecystectomy      History of Present Illness   Annabel Sorensen is a 64 y.o. female who presents to my electrophysiology clinic for follow up of PAF.  Since we last saw her, she is doing a bit better but still having dizziness and head fogginess.     Patient currently on three anxiolytic meds. Her symptoms of dizziness happens with high blood pressure and low blood pressure. No chest pain. No palpitation. No syncope.     Outpatient Medications Marked as Taking for the 3/14/25 encounter (Office Visit) with Brady Weiner MD   Medication Sig Dispense Refill    amitriptyline (ELAVIL) 25 MG tablet Take 1 tablet by mouth Every Night.      amLODIPine (NORVASC) 5 MG tablet Take 1 tablet by mouth Daily. 0.5 tablet daily      carvedilol (COREG) 6.25 MG tablet Take 1 tablet by mouth 2 (Two) Times a Day. 180 tablet 3    cholecalciferol (VITAMIN D3) 1000 units tablet Take 2 tablets by mouth Daily.      clonazePAM (KlonoPIN) 1 MG tablet  "Take 1 tablet by mouth 2 (Two) Times a Day.      flecainide (TAMBOCOR) 100 MG tablet Take 1 tablet by mouth 2 (Two) Times a Day.      fluticasone (FLONASE) 50 MCG/ACT nasal spray Administer 2 sprays into the nostril(s) as directed by provider Daily.      furosemide (LASIX) 20 MG tablet Take 0.5 tablets by mouth Daily As Needed. If gained 4lbs      irbesartan (AVAPRO) 300 MG tablet Take 1 tablet by mouth Daily.      naproxen sodium (ALEVE) 220 MG tablet Take 1 tablet by mouth As Needed.      nitroglycerin (NITROSTAT) 0.4 MG SL tablet Place 1 tablet under the tongue Every 5 (Five) Minutes As Needed for Chest Pain. Take no more than 3 doses in 15 minutes.      PARoxetine (PAXIL) 20 MG tablet Take 1 tablet by mouth Every Morning.      [] predniSONE (DELTASONE) 5 MG tablet Take 1 tablet by mouth Daily.      rivaroxaban (XARELTO) 20 MG tablet Take 1 tablet by mouth Daily. 30 tablet 6            Physical Exam  Vitals:    25 1333   BP: 160/86   BP Location: Right arm   Patient Position: Sitting   Pulse: 73   SpO2: 97%   Weight: 98 kg (216 lb)   Height: 162.6 cm (64\")       GENERAL: Well-developed, well-nourished patient in no acute distress.  HEENT: NC, AC, PERRLA. MMM  NECK: No JVD. No carotid bruits auscultated.  LUNGS: Clear to auscultation bilaterally.  CARDIOVASCULAR: RRR No murmurs, gallops or rubs noted.   ABDOMEN: Soft, nontender. Positive bowel sounds.  MUSCULOSKELETAL: No gross deformities. No clubbing, cyanosis  EXT: pulses intact, No edema  SKIN: Pink, warm  Neuro: Nonfocal exam. Gait intact    Diagnostic Data  Procedure     ECG 12 Lead    Date/Time: 3/17/2025 8:36 PM  Performed by: Brady Weiner MD    Authorized by: Brady Weiner MD  Comparison: not compared with previous ECG   Rhythm: sinus rhythm  Rate: normal  Conduction: conduction normal  QRS axis: normal  Other findings: non-specific ST-T wave changes    Clinical impression: abnormal EKG             No results found for: \"GLUCOSE\", \"CALCIUM\", " "\"NA\", \"K\", \"CO2\", \"CL\", \"BUN\", \"CREATININE\", \"EGFRIFAFRI\", \"EGFRIFNONA\", \"BCR\", \"ANIONGAP\"  Lab Results   Component Value Date    WBC 11.13 (H) 10/14/2024    HGB 13 10/14/2024    HCT 42.2 10/14/2024    MCV 88 10/14/2024     10/14/2024     No results found for: \"INR\", \"PROTIME\"  No results found for: \"TSH\", \"Z3QKRQH\", \"Q1EWBUU\", \"THYROIDAB\"    I personally viewed and interpreted the patient's EKG/Telemetry/lab data      Assessment and Plan  Diagnoses and all orders for this visit:    1. PAF (paroxysmal atrial fibrillation) (Primary)    2. Primary hypertension    Other orders  -     ECG 12 Lead            Paroxysmal atrial fibrillation  -[id number] 2 (sex, HTN),  anticoagulated with Xarelto  -Patient on flecainide and carvedilol  -Holter, 7/23/24: Predominantly NSR. PAC/PVC occurred rarely.   -doing well from rhythm standpoint    Hypertension  -Well controlled, continue current management.     JOELLEN  -Compliant with CPAP    Dizziness  Discussed re-engaging her provider re: coming off of some of her anxiolytic medications: amitriptyline, paroxetine, klonopin. Patient notes that she will follow up with her provider.       Body mass index is 37.08 kg/m².    Follow-Up  Return in about 6 months (around 9/14/2025).    Thank you for allowing me to participate in the care of your patient. Please to not hesitate to contact me with additional questions or concerns.     Brady Weiner MD [facility]  Cardiac Electrophysiologist  [facility] / [facility]      "

## 2025-05-19 RX ORDER — RIVAROXABAN 20 MG/1
20 TABLET, FILM COATED ORAL DAILY
Qty: 90 TABLET | Refills: 1 | Status: SHIPPED | OUTPATIENT
Start: 2025-05-19